# Patient Record
Sex: FEMALE | Race: WHITE | Employment: OTHER | URBAN - METROPOLITAN AREA
[De-identification: names, ages, dates, MRNs, and addresses within clinical notes are randomized per-mention and may not be internally consistent; named-entity substitution may affect disease eponyms.]

---

## 2017-02-01 ENCOUNTER — HOSPITAL ENCOUNTER (OUTPATIENT)
Dept: OTHER | Age: 82
Discharge: HOME | End: 2017-02-01
Attending: INTERNAL MEDICINE | Admitting: INTERNAL MEDICINE

## 2017-02-15 LAB
ALBUMIN SERPL-MCNC: 4.4 GM/DL (ref 3.4–5)
ALP BLD-CCNC: 74 IU/L (ref 40–129)
ALT SERPL-CCNC: 16 U/L (ref 10–40)
ANION GAP SERPL CALCULATED.3IONS-SCNC: 13 MMOL/L (ref 4–16)
AST SERPL-CCNC: 25 IU/L (ref 15–37)
BILIRUB SERPL-MCNC: 1 MG/DL (ref 0–1)
BUN BLDV-MCNC: 16 MG/DL (ref 6–23)
CALCIUM SERPL-MCNC: 10.1 MG/DL (ref 8.3–10.6)
CHLORIDE BLD-SCNC: 98 MMOL/L (ref 99–110)
CHOLESTEROL: 192 MG/DL
CO2: 28 MMOL/L (ref 21–32)
CREAT SERPL-MCNC: 0.8 MG/DL (ref 0.6–1.1)
GFR AFRICAN AMERICAN: >60 ML/MIN/1.73M2
GFR NON-AFRICAN AMERICAN: >60 ML/MIN/1.73M2
GLUCOSE FASTING: 99 MG/DL (ref 70–99)
HCT VFR BLD CALC: 43.9 % (ref 37–47)
HDLC SERPL-MCNC: 110 MG/DL
HEMOGLOBIN: 14.4 GM/DL (ref 12.5–16)
LDL CHOLESTEROL DIRECT: 92 MG/DL
MCH RBC QN AUTO: 32.4 PG (ref 27–31)
MCHC RBC AUTO-ENTMCNC: 32.8 % (ref 32–36)
MCV RBC AUTO: 98.7 FL (ref 78–100)
PDW BLD-RTO: 13.3 % (ref 11.7–14.9)
PLATELET # BLD: 202 K/CU MM (ref 140–440)
PMV BLD AUTO: 9.8 FL (ref 7.5–11.1)
POTASSIUM SERPL-SCNC: 3.9 MMOL/L (ref 3.5–5.1)
RBC # BLD: 4.45 M/CU MM (ref 4.2–5.4)
SODIUM BLD-SCNC: 139 MMOL/L (ref 135–145)
T4 FREE: 1.31 NG/DL (ref 0.9–1.8)
TOTAL CK: 101 IU/L (ref 26–140)
TOTAL PROTEIN: 7 GM/DL (ref 6.4–8.2)
TRIGL SERPL-MCNC: 58 MG/DL
TSH HIGH SENSITIVITY: 1.3 UIU/ML (ref 0.27–4.2)
VITAMIN B-12: 647.7 PG/ML (ref 211–911)
WBC # BLD: 5.5 K/CU MM (ref 4–10.5)

## 2017-02-18 LAB
VITAMIN D 1,25-DIHYDROXY: 31.6
VITAMIN D 25-HYDROXY: 28.51 NG/ML

## 2017-07-24 ENCOUNTER — HOSPITAL ENCOUNTER (OUTPATIENT)
Dept: SPEECH THERAPY | Age: 82
Discharge: HOME | End: 2017-07-31
Attending: FAMILY MEDICINE | Admitting: FAMILY MEDICINE

## 2017-07-24 DIAGNOSIS — I63.419 CEREBRAL INFARCTION DUE TO EMBOLISM OF MIDDLE CEREBRAL ARTERY, UNSPECIFIED BLOOD VESSEL LATERALITY (HCC): Primary | ICD-10-CM

## 2017-07-27 ENCOUNTER — HOSPITAL ENCOUNTER (OUTPATIENT)
Dept: SPEECH THERAPY | Age: 82
Discharge: HOME | End: 2017-07-27
Admitting: FAMILY MEDICINE

## 2017-07-31 ENCOUNTER — HOSPITAL ENCOUNTER (OUTPATIENT)
Dept: SPEECH THERAPY | Age: 82
Discharge: HOME | End: 2017-07-31
Admitting: FAMILY MEDICINE

## 2017-08-01 ENCOUNTER — HOSPITAL ENCOUNTER (OUTPATIENT)
Dept: OTHER | Age: 82
Discharge: HOME | End: 2017-08-31
Attending: FAMILY MEDICINE | Admitting: FAMILY MEDICINE

## 2017-08-03 ENCOUNTER — HOSPITAL ENCOUNTER (OUTPATIENT)
Dept: SPEECH THERAPY | Age: 82
Discharge: HOME | End: 2017-08-03
Admitting: FAMILY MEDICINE

## 2017-08-10 ENCOUNTER — HOSPITAL ENCOUNTER (OUTPATIENT)
Dept: SPEECH THERAPY | Age: 82
Discharge: HOME | End: 2017-08-10
Admitting: FAMILY MEDICINE

## 2017-08-14 ENCOUNTER — HOSPITAL ENCOUNTER (OUTPATIENT)
Dept: SPEECH THERAPY | Age: 82
Discharge: HOME | End: 2017-08-14
Admitting: FAMILY MEDICINE

## 2017-08-17 ENCOUNTER — HOSPITAL ENCOUNTER (OUTPATIENT)
Dept: SPEECH THERAPY | Age: 82
Discharge: HOME | End: 2017-08-17
Admitting: FAMILY MEDICINE

## 2017-08-21 ENCOUNTER — HOSPITAL ENCOUNTER (OUTPATIENT)
Dept: SPEECH THERAPY | Age: 82
Discharge: HOME | End: 2017-08-21
Admitting: FAMILY MEDICINE

## 2017-08-24 ENCOUNTER — HOSPITAL ENCOUNTER (OUTPATIENT)
Dept: SPEECH THERAPY | Age: 82
Discharge: HOME | End: 2017-08-24
Admitting: FAMILY MEDICINE

## 2017-08-28 ENCOUNTER — HOSPITAL ENCOUNTER (OUTPATIENT)
Dept: SPEECH THERAPY | Age: 82
Discharge: HOME | End: 2017-08-28
Admitting: FAMILY MEDICINE

## 2017-09-01 ENCOUNTER — HOSPITAL ENCOUNTER (OUTPATIENT)
Dept: OTHER | Age: 82
Discharge: HOME | End: 2017-09-30
Attending: FAMILY MEDICINE | Admitting: FAMILY MEDICINE

## 2017-09-07 ENCOUNTER — HOSPITAL ENCOUNTER (OUTPATIENT)
Dept: SPEECH THERAPY | Age: 82
Discharge: HOME | End: 2017-09-07
Admitting: FAMILY MEDICINE

## 2017-09-11 ENCOUNTER — HOSPITAL ENCOUNTER (OUTPATIENT)
Dept: SPEECH THERAPY | Age: 82
Discharge: HOME | End: 2017-09-11
Admitting: FAMILY MEDICINE

## 2017-09-14 ENCOUNTER — HOSPITAL ENCOUNTER (OUTPATIENT)
Dept: SPEECH THERAPY | Age: 82
Discharge: HOME | End: 2017-09-14
Admitting: FAMILY MEDICINE

## 2017-09-18 ENCOUNTER — HOSPITAL ENCOUNTER (OUTPATIENT)
Dept: SPEECH THERAPY | Age: 82
Discharge: HOME | End: 2017-09-18
Admitting: FAMILY MEDICINE

## 2017-09-21 ENCOUNTER — HOSPITAL ENCOUNTER (OUTPATIENT)
Dept: SPEECH THERAPY | Age: 82
Discharge: HOME | End: 2017-09-21
Admitting: FAMILY MEDICINE

## 2017-09-25 ENCOUNTER — HOSPITAL ENCOUNTER (OUTPATIENT)
Dept: SPEECH THERAPY | Age: 82
Discharge: HOME | End: 2017-09-25
Admitting: FAMILY MEDICINE

## 2017-09-28 ENCOUNTER — HOSPITAL ENCOUNTER (OUTPATIENT)
Dept: SPEECH THERAPY | Age: 82
Discharge: HOME | End: 2017-09-28
Admitting: FAMILY MEDICINE

## 2017-10-01 ENCOUNTER — HOSPITAL ENCOUNTER (OUTPATIENT)
Dept: OTHER | Age: 82
Discharge: HOME | End: 2017-10-17
Attending: FAMILY MEDICINE | Admitting: FAMILY MEDICINE

## 2017-10-02 ENCOUNTER — HOSPITAL ENCOUNTER (OUTPATIENT)
Dept: SPEECH THERAPY | Age: 82
Discharge: HOME | End: 2017-10-02
Admitting: FAMILY MEDICINE

## 2017-10-02 NOTE — FLOWSHEET NOTE
questions   Reading comprehension of five sentence passages was 70% accurate based on responses to yes/no questions. Not addressed today. Martina Davenport reported she has issues with picking up where she left off in books she is reading at home. Not addressed today Not addressed today Not addressed today Not addressed today Not addressed today     Goal 3: Will demonstrate improved word fluency as measured by generating 15 or more items within a category accross three categories within one minute each   Martina Davenport completed word retrieval tasks with 100% accuracy and no further instruction needed to complete additional tasks. Martina Davenport completed word retrieval tasks during her home program with 100% accuracy. Martina Davenport completed word retrieval tasks during her home program.  Martina Davenport needed minimal verbal cues to discuss adding items to abstract categories. Martina Davenport reported she thoroughly enjoyed completing her word retrieval tasks at home. Martina Davenport completed the task to add concrete and abstract items to a  category with no cues. Martina Davenport did not need cues to add to concrete categories. She needed minimal cues to add to abstract categories. With descriptive verbal cues from the clinician, she was able to come up with the right word within the category. Will plan to assess next session. Martina Davenport and the clinician discussed adding items to concrete categories. The categories today involved movies, dancers, singers, and actors. She needed moderate verbal cues to do this. She benefited from semantic and initial letter name cues. When she could not think of the right name within the category, she was given the names of people. This happened twice. She expressed her enjoyment in learning about modern day items fitting in these categories. Discussed adding items to concrete categories. She needed minimal verbal cues to do so. Her word retrieval was prompt when she was given semantic and initial letter name cues.       Goal 4: discontinued Goal discontinued Goal discontinued   Goal 7- Will demonstrate functional executive function for daily tasks (goal added 8-) Mario Alberto Levi completed an activity on analyzing a bill,  writing a check to pay a bill, and balancing a checkbook. She demonstrated 100% accuracy in response to problem solving questions regarding checks and bills. Mario Alberto Levi wrote a check and balanced a checkbook with no cues. Mario Alberto Levi self-monitored throughout and expressed her need for someone to check her work when finished. Plan to communicate with her daughter and daughter-in-law to discuss her ability to complete bill paying with distant supervision. In today's session, a note was sent home with Mario Alberto Levi to review with her daughter and daughter-in-law to advocate to complete her bill payments with some distance supervision. Mario Alberto Levi also reviewed the [employer] and calendar and demonstrated her understanding of certain event dates, times, and details. A questionnaire with items regarding activities of daily living, problem-solving, and social-emotional issues was reviewed and sent home for Mario Alberto Levi to complete. Mario Alberto Levi verbalized knowing her limits for tasks at home. The information obtained from this questionnaire will guide future therapy activities to cater to her needs at home. Mario Alberto Levi reported that she will be responsible for paying some of her bills with supervision. Mario Albreto Levi reported that she uses strategies for completing daily activities at home and indicated that she wished to discontinue this goal and focus on improving her word retrieval skills. Mario Alberto Levi indicated that she will complete the daily living questionnaire and discuss her functional daily tasks in the next session. She reported going to the store over the weekend and that she was unable to function in this environment like she used to.  Mario Alberto Levi completed the daily living questionnaire and reported to not want to focus on any functional activities at home. Although she writes her physical and mental abilities have changed, she would like to continue focusing on her issues with word retrieval. Goal discontinued Goal discontinued    FEffective Strategies that Improve fx: Modeling of target responses, written cues Modeling of target responses Modeling of target responses Modeling of target responses Modeling of target responses Modeling of target responses Modeling of target responses   Barriers to progress: Medical diagnosis Medical diagnosis Medical diagnosis Medical diagnosis Medical diagnosis Medical diagnosis Medical diagnosis   Education completed:     Reviewed treatment goals, progress in session and home program with Ny Seay who verbalized understanding Reviewed treatment goals, progress in session and home program with Ny Seay who verbalized understanding Reviewed treatment goals, progress in session and home program with Ny Seay who verbalized understanding Reviewed treatment goals, progress in session and home program with Ny Seay who verbalized understanding Reviewed treatment goals, progress in session and home program with Ny Seay who verbalized understanding Reviewed treatment goals, progress in session and home program with Ny Seay who verbalized understanding Reviewed treatment goals, progress in session and home program with Ny Seay who verbalized understanding   Home Exercise Plan:     Word retrieval worksheets Outpatient Daily Living Questionnaire  Word retrieval worksheets (concrete and abstract categories) Outpatient Daily Living Questionnaire; Word retrieval worksheets (concrete and abstract categories); Listing 5 items in a category Word retrieval worksheets (concrete and abstract categories);  Listing 5 items in a category Word retrieval worksheet (adding to concrete categories) Word retrieval worksheet (matching a quality descriptor to an item and listing items based on locations)     Objective Findings: see

## 2017-10-05 ENCOUNTER — HOSPITAL ENCOUNTER (OUTPATIENT)
Dept: SPEECH THERAPY | Age: 82
Discharge: HOME | End: 2017-10-05
Admitting: FAMILY MEDICINE

## 2017-10-05 NOTE — FLOWSHEET NOTE
[x][facility] [address]fr[address]      MAR[address] [facility] [address] Dept          Outpatient Rehab Dept     [address]       [facility] [address], [address], [address]       [address]     [phone] [phone] [phone]  [][facility] Rishirânio [address]           Outpatient Speech Dept. 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 Lorena Hammer of care signed (Y/N):  Not yet       Treatment Provided: cognitive treatment/speech treatment     Date 2017 0- 10-2-2017 10-5-2017   Time in/Time out 10:24-11:25AM 10:26-11:15AM 10:15-11:13AM 10:37-11:20AM 9:12-10:00AM 10:36-11:04AM 10:31-11:01AM 10:24-10:54AM   Total Timed Code Min 61 mins 49 mins 58 mins 0 0 0 0 0   Total Treatment Minutes 61 mins 49 mins 58 mins 43 mins 48 mins 28 mins 30 mins 30 mins   Units  G Code applied: 4 cognitive 3 cognitive 4 cognitive 1 speech 1 speech 1 speech 1 speech 1 speech   Subjective     Alert, cooperative. Claris Merlin was highly motivated and reported that she will continue to advocate for her financial independence with some support.   She also reported think of the right name within the category, she was given the names of people. This happened twice. She expressed her enjoyment in learning about modern day items fitting in these categories. Discussed adding items to concrete categories. She needed minimal verbal cues to do so. Her word retrieval was prompt when she was given semantic and initial letter name cues. Discussed her home programming word retrieval activities that she completed with 100% accuracy. She indicated these were easy for her. Goal 4: Will demonstrate accurate word choice within connected speech with 90% or better consistency within a 15 or longer sample   In 5 minutes of a language sample, Ninoska Epstein displyed 86% accuracy in her word choice. In a 10 minute language sample in the session, Ninoska Epstein demonstrated 80% accurate word choice. In a 15 minute conversation about her progress, Ninoska Epstein displayed 90% accurate word choice. In a 15 minute conversation about her goals and home life, Ninoska Epstein demonstrated 75% accurate word choice. In a 10 minute unstructured conversation, Ninoska Epstein demonstrated 70% accurate word choice. She made more word choice errors than usual is today's session. Ninoska Epstein demonstrated accurate word choice 80% of the time in this session. Ninoska Epstein demonstrated accurate word choice 90% this session. Ninoska Epstein demonstrated 2 specific word finding issues in this session. Her accurate word choice was 95% this session.     FEffective Strategies that Improve fx: Modeling of target responses, written cues Modeling of target responses Modeling of target responses Modeling of target responses Modeling of target responses Modeling of target responses Modeling of target responses Modeling of target responses   Barriers to progress: Medical diagnosis Medical diagnosis Medical diagnosis Medical diagnosis Medical diagnosis Medical diagnosis Medical diagnosis Medical diagnosis   Education completed:     Reviewed treatment goals, progress in session and home program with Nicholas Ny who verbalized understanding Reviewed treatment goals, progress in session and home program with Nicholas Ny who verbalized understanding Reviewed treatment goals, progress in session and home program with Nicholas Ny who verbalized understanding Reviewed treatment goals, progress in session and home program with Nicholas Ny who verbalized understanding Reviewed treatment goals, progress in session and home program with Nicholas Ny who verbalized understanding Reviewed treatment goals, progress in session and home program with Nicholas Ny who verbalized understanding Reviewed treatment goals, progress in session and home program with Nicholas Ny who verbalized understanding Reviewed treatment goals, progress in session and home program with Nicholas Ny who verbalized understanding   Home Exercise Plan:     Word retrieval worksheets Outpatient Daily Living Questionnaire  Word retrieval worksheets (concrete and abstract categories) Outpatient Daily Living Questionnaire; Word retrieval worksheets (concrete and abstract categories); Listing 5 items in a category Word retrieval worksheets (concrete and abstract categories);  Listing 5 items in a category Word retrieval worksheet (adding to concrete categories) Word retrieval worksheet (matching a quality descriptor to an item and listing items based on locations) Word retrieval worksheet (adding to concrete categories of people)     Objective Findings: see above    Interventions used this date:  [x] Speech Treatment       [x] Instruction in HEP  [] Group   [] Dysphagia Treatment   [] Cognitive Skill Yury  [] Motor  [] Voice Treatment       []  AAC  Other:    Communication with other providers: none    Adverse Reactions to treatment: none     Treatment/Activity Tolerance:     [x]  Patient tolerated treatment well []  Patient limited by fatique    []  Patient limited by pain []  Patient limited by other medical complications   []  Other:     Patient Requires

## 2017-10-12 ENCOUNTER — HOSPITAL ENCOUNTER (OUTPATIENT)
Dept: SPEECH THERAPY | Age: 82
Discharge: HOME | End: 2017-10-12
Admitting: FAMILY MEDICINE

## 2017-10-12 NOTE — FLOWSHEET NOTE
[x][facility] [address]      MARLON [facility] [address] Dept          Outpatient Rehab Dept     [address]       [facility] [address], [address], [address]       Rosanna Verdin, [address]     [phone] [phone] [phone]  [][facility] [address]elyio [address]           Outpatient Speech Dept. 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 Travis Ross of care signed (Y/N):  Not yet       Treatment Provided: cognitive treatment/speech treatment     Date 2017 6- 10-2-2017 10-5-2017 10-   Time in/Time out 10:24-11:25AM 10:26-11:15AM 10:15-11:13AM 10:37-11:20AM 9:12-10:00AM 10:36-11:04AM 10:31-11:01AM 10:24-10:54AM 10:35-11:05AM   Total Timed Code Min 61 mins 49 mins 58 mins 0 0 0 0 0 0   Total Treatment Minutes 61 mins 49 mins 58 mins 43 mins 48 mins 28 mins 30 mins 30 mins 30 mins   Units  G Code applied: 4 cognitive 3 cognitive 4 cognitive 1 speech 1 speech 1 speech 1 speech 1 speech 1 speech   Subjective     Alert, cooperative.   Nicholas Anant was highly motivated and reported that she will continue to advocate for her financial addressed today Not addressed today Not addressed today Not addressed today Based on responses to open ended, yes/no, and m/c questions, Josh Fajardo demonstrated 83% accuracy for auditory comprehension of 6 sentence passages. Goal met Not addressed today. Goal 2: Will demonstrate reading comprehension of 6 sentence passages with 80% or better accuracy based upon responses to yes/no questions   Reading comprehension of five sentence passages was 70% accurate based on responses to yes/no questions. Not addressed today. Josh Fajardo reported she has issues with picking up where she left off in books she is reading at home. Not addressed today Not addressed today Not addressed today Not addressed today Not addressed today Based on responses to m/c questions, Josh Fajardo demonstrated 90% accuracy for reading comprehension of passages with multiple paragraphs. Goal met Not addressed today. Goal 3: Will demonstrate improved word fluency as measured by generating 15 or more items within a category accross three categories within one minute each   Josh Fajardo completed word retrieval tasks with 100% accuracy and no further instruction needed to complete additional tasks. Josh Fajardo completed word retrieval tasks during her home program with 100% accuracy. Jsoh Fajardo completed word retrieval tasks during her home program.  Josh Fajardo needed minimal verbal cues to discuss adding items to abstract categories. Josh Fajardo reported she thoroughly enjoyed completing her word retrieval tasks at home. Josh Fajardo completed the task to add concrete and abstract items to a  category with no cues. Josh Fajardo did not need cues to add to concrete categories. She needed minimal cues to add to abstract categories. With descriptive verbal cues from the clinician, she was able to come up with the right word within the category. Will plan to assess next session. Josh Fajardo and the clinician discussed adding items to concrete categories.   The categories today involved movies, dancers, singers, and actors. She needed moderate verbal cues to do this. She benefited from semantic and initial letter name cues. When she could not think of the right name within the category, she was given the names of people. This happened twice. She expressed her enjoyment in learning about modern day items fitting in these categories. Discussed adding items to concrete categories. She needed minimal verbal cues to do so. Her word retrieval was prompt when she was given semantic and initial letter name cues. Discussed her home programming word retrieval activities that she completed with 100% accuracy. She indicated these were easy for her. Discussed adding names to concrete categories (e.g., athletes, Sikh leaders) and needed minimal cues to do so. Discussed booklet of home programming word retrieval activities for her to complete. Goal 4: Will demonstrate accurate word choice within connected speech with 90% or better consistency within a 15 or longer sample   In 5 minutes of a language sample, Radha Pabon displyed 86% accuracy in her word choice. In a 10 minute language sample in the session, Radha Pabon demonstrated 80% accurate word choice. In a 15 minute conversation about her progress, Radha Pabon displayed 90% accurate word choice. In a 15 minute conversation about her goals and home life, Radha Pabon demonstrated 75% accurate word choice. In a 10 minute unstructured conversation, Radha Pabon demonstrated 70% accurate word choice. She made more word choice errors than usual is today's session. Radha Pabon demonstrated accurate word choice 80% of the time in this session. Radha Pabon demonstrated accurate word choice 90% this session. Radha Pabon demonstrated 2 specific word finding issues in this session. Her accurate word choice was 95% this session. Not addressed this date.     FEffective Strategies that Improve fx: Modeling of target responses, written cues Modeling of target responses categories of people) Word retrieval worksheet (adding to concrete categories of people)     Objective Findings: see above      G-Code Selection: (On Eval and every 10th visit or Discharge)    MEASURE    [x]  Spoken Language/Comprehension     [] Current ()  [x] Goal ()  [x] DC ()      SEVERITY    CURRENT GOAL  DISCHARGE   [] CH (0% Impaired, NOMS 7)  [] CI (1-19% Impaired, NOMS 6)  [] CJ (20-39% Impaired, NOMS 5)  [] CK  (40-59% Impairment, NOMS 4)  [] CL  (60-79% Impairment, NOMS 3 )  [] CM  (80-99% Impairment, NOMS 2)   [] CN  (100% Impairment, NOMS 1) [x] CH (0% Impaired, NOMS 7)  [] CI (1-19% Impaired, NOMS 6)  [] CJ (20-39% Impaired, NOMS 5)  [] CK  (40-59% Impairment, NOMS 4)  [] CL  (60-79% Impairment, NOMS 3 )  [] CM  (80-99% Impairment, NOMS 2)   [] CN  (100% Impairment, NOMS 1) [x] CH (0% Impaired, NOMS 7)  [] CI (1-19% Impaired, NOMS 6)  [] CJ (20-39% Impaired, NOMS 5)  [] CK  (40-59% Impairment, NOMS 4)  [] CL  (60-79% Impairment, NOMS 3 )  [] CM  (80-99% Impairment, NOMS 2)   [] CN  (100% Impairment, NOMS 1)         [x] Spoken Language/Expression     [] Current ()  [x] Goal ()  [x] DC ()    SEVERITY    CURRENT GOAL  DISCHARGE   [] CH (0% Impaired, NOMS 7)  [] CI (1-19% Impaired, NOMS 6)  [] CJ (20-39% Impaired, NOMS 5)  [] CK  (40-59% Impairment, NOMS 4)  [] CL  (60-79% Impairment, NOMS 3 )  [] CM  (80-99% Impairment, NOMS 2)   [] CN  (100% Impairment, NOMS 1) [] CH (0% Impaired, NOMS 7)  [x] CI (1-19% Impaired, NOMS 6)  [] CJ (20-39% Impaired, NOMS 5)  [] CK  (40-59% Impairment, NOMS 4)  [] CL  (60-79% Impairment, NOMS 3 )  [] CM  (80-99% Impairment, NOMS 2)   [] CN  (100% Impairment, NOMS 1) [x] CH (0% Impaired, NOMS 7)  [] CI (1-19% Impaired, NOMS 6)  [] CJ (20-39% Impaired, NOMS 5)  [] CK  (40-59% Impairment, NOMS 4)  [] CL  (60-79% Impairment, NOMS 3 )  [] CM  (80-99% Impairment, NOMS 2)   [] CN  (100% Impairment, NOMS 1)          [x] Other Speech/Language    [] Current () [x] Goal () [x] DC ()    SEVERITY    CURRENT GOAL  DISCHARGE   [] CH (0% Impaired, NOMS 7)  [] CI (1-19% Impaired, NOMS 6)  [] CJ (20-39% Impaired, NOMS 5)  [] CK  (40-59% Impairment, NOMS 4)  [] CL  (60-79% Impairment, NOMS 3 )  [] CM  (80-99% Impairment, NOMS 2)   [] CN  (100% Impairment, NOMS 1) [] CH (0% Impaired, NOMS 7)  [x] CI (1-19% Impaired, NOMS 6)  [] CJ (20-39% Impaired, NOMS 5)  [] CK  (40-59% Impairment, NOMS 4)  [] CL  (60-79% Impairment, NOMS 3 )  [] CM  (80-99% Impairment, NOMS 2)   [] CN  (100% Impairment, NOMS 1) [x] CH (0% Impaired, NOMS 7)  [] CI (1-19% Impaired, NOMS 6)  [] CJ (20-39% Impaired, NOMS 5)  [] CK  (40-59% Impairment, NOMS 4)  [] CL  (60-79% Impairment, NOMS 3 )  [] CM  (80-99% Impairment, NOMS 2)   [] CN  (100% Impairment, NOMS 1)       Interventions used this date:  [x] Speech Treatment       [x] Instruction in HEP  [] Group   [] Dysphagia Treatment   [] Cognitive Skill Yury  [] Motor  [] Voice Treatment       []  AAC  Other:    Communication with other providers: none    Adverse Reactions to treatment: none     Treatment/Activity Tolerance:     [x]  Patient tolerated treatment well []  Patient limited by fatique    []  Patient limited by pain []  Patient limited by other medical complications   []  Other:     Patient Requires Follow-up:  []  Yes  [x]  No    Plan: []  Continue per plan of care []  Alter current plan (see comments)   []  Plan of care initiated []  Hold pending MD visit [x]  Discharge    Electronically signed by:   Anthony Viera  Graduate Clinician    Azeb Lopez.  MS Stewart, CCC-SLP  Speech/Language Pathologist  10/12/2017  1:54 PM

## 2017-10-12 NOTE — PROGRESS NOTES
Other:     Significant Findings At Last Visit/Comments:  Ninoska Epstein reported her improvement of functional skills at home and indicated she wanted to focus on word retrieval activities. She demonstrated improvements in completing word retrieval activities independently and advocated for discharge. Progress toward goals:    Goal 1: Will demonstrate auditory comprehension of 6 sentence passages with 80% or bettter accuracy based upon responses to yes/no questions  Progress: Auditory comprehension was last assessed on 10/5/2017. Based on responses to open ended, yes/no, and m/c questions, Ninoska Epstein demonstrated 83% accuracy for auditory comprehension of 6 sentence passages. Ninoska Epstein consistently demonstrated comprehension of passages and met this goal.    Goal 2: Will demonstrate reading comprehension of 6 sentence passages with 80% or better accuracy based upon responses to yes/no questions  Progress: Auditory comprehension was last assessed on 10/5/2017. Based on responses to m/c questions, Ninoska Epstein demonstrated 90% accuracy for reading comprehension of passages with multiple paragraphs. Ninoska Epstein consistently demonstrated comprehension of passages and met this goal.    Goal 3: Will demonstrate improved word fluency as measured by generating 15 or more items within a category accross three categories within one minute each  Progress: In the last few weeks, Ninoska Epstein practiced adding items to both concrete and abstract categories and utilized her word finding strategies and associations to do so accurately (e.g., opposites, initial letters, descriptions of items). She completed all home programming activities with accuracy and needed occasional minimal verbal cues for more complex categories/topics. Ninoska Epstein demonstrated appropriate word choice within connected speech and a decrease in delay in responses in the last few sessions. Goal 4:  Will demonstrate accurate word choice within connected speech with 90% or better consistency within a 15 or longer sample  Progress:  Yanna Angeles demonstrated measurable word choice in the last few weeks. Her accurate word choice remained >80% in the 6/8 of the last sessions. In the last session, this goal was informally assessed and Yanna Angeles demonstrated no word finding errors. Frequency/Duration:  # Days per week: [] 1 day # Weeks: [] 1 week [] 4 weeks      [x] 2 days? [] 2 weeks [] 5 weeks     [] 3 days   [] 3 weeks [] 6 weeks     Rehab Potential: [] Excellent [x] Good [] Fair  [] Poor     Goal Status:  [x] Achieved [] Partially Achieved  [] Not Achieved     Patient Status: [] Continue per initial plan of Care     [x] Patient now discharged     [] Additional visits requested, Please re-certify for additional visits:    Electronically signed by:   Tawnya Ham Stage  Graduate Clinician    Suzanna Whitaker.  MS Stewart, CCC-SLP  Speech/Language Pathologist  10/12/2017  2:17 PM

## 2018-11-01 ENCOUNTER — HOSPITAL ENCOUNTER (OUTPATIENT)
Age: 83
Setting detail: SPECIMEN
Discharge: HOME | End: 2018-11-01
Payer: MEDICARE

## 2018-11-01 LAB
FOLATE: 11.2 NG/ML (ref 3.1–17.5)
VITAMIN B-12: 459.4 PG/ML (ref 211–911)

## 2018-11-01 PROCEDURE — 82746 ASSAY OF FOLIC ACID SERUM: CPT

## 2018-11-01 PROCEDURE — 36415 COLL VENOUS BLD VENIPUNCTURE: CPT

## 2018-11-01 PROCEDURE — 82607 VITAMIN B-12: CPT

## 2019-01-03 ENCOUNTER — APPOINTMENT (OUTPATIENT)
Dept: GENERAL RADIOLOGY | Age: 84
End: 2019-01-03
Payer: MEDICARE

## 2019-01-03 ENCOUNTER — HOSPITAL ENCOUNTER (EMERGENCY)
Age: 84
Discharge: HOME | End: 2019-01-03
Attending: EMERGENCY MEDICINE
Payer: MEDICARE

## 2019-01-03 VITALS
TEMPERATURE: 97.7 F | SYSTOLIC BLOOD PRESSURE: 178 MMHG | WEIGHT: 115.4 LBS | HEART RATE: 60 BPM | BODY MASS INDEX: 18.11 KG/M2 | RESPIRATION RATE: 14 BRPM | DIASTOLIC BLOOD PRESSURE: 53 MMHG | OXYGEN SATURATION: 98 % | HEIGHT: 67 IN

## 2019-01-03 DIAGNOSIS — S32.591A OTHER CLOSED FRACTURE OF RIGHT PUBIS, INITIAL ENCOUNTER (HCC): Primary | ICD-10-CM

## 2019-01-03 PROCEDURE — 6370000000 HC RX 637 (ALT 250 FOR IP): Performed by: EMERGENCY MEDICINE

## 2019-01-03 PROCEDURE — 73501 X-RAY EXAM HIP UNI 1 VIEW: CPT

## 2019-01-03 PROCEDURE — 99283 EMERGENCY DEPT VISIT LOW MDM: CPT

## 2019-01-03 RX ORDER — IBUPROFEN 400 MG/1
400 TABLET ORAL ONCE
Status: COMPLETED | OUTPATIENT
Start: 2019-01-03 | End: 2019-01-03

## 2019-01-03 RX ADMIN — IBUPROFEN 400 MG: 400 TABLET ORAL at 17:25

## 2019-01-03 ASSESSMENT — PAIN DESCRIPTION - ORIENTATION: ORIENTATION: RIGHT

## 2019-01-03 ASSESSMENT — PAIN DESCRIPTION - PAIN TYPE: TYPE: ACUTE PAIN

## 2019-01-03 ASSESSMENT — PAIN DESCRIPTION - DESCRIPTORS: DESCRIPTORS: ACHING;DISCOMFORT

## 2019-01-03 ASSESSMENT — PAIN DESCRIPTION - LOCATION: LOCATION: HIP

## 2019-01-03 ASSESSMENT — PAIN DESCRIPTION - ONSET: ONSET: SUDDEN

## 2019-01-03 ASSESSMENT — PAIN SCALES - GENERAL: PAINLEVEL_OUTOF10: 5

## 2019-01-03 ASSESSMENT — PAIN DESCRIPTION - FREQUENCY: FREQUENCY: CONTINUOUS

## 2019-04-18 ENCOUNTER — APPOINTMENT (OUTPATIENT)
Dept: CT IMAGING | Age: 84
DRG: 689 | End: 2019-04-18
Payer: MEDICARE

## 2019-04-18 ENCOUNTER — APPOINTMENT (OUTPATIENT)
Dept: GENERAL RADIOLOGY | Age: 84
DRG: 689 | End: 2019-04-18
Payer: MEDICARE

## 2019-04-18 ENCOUNTER — HOSPITAL ENCOUNTER (INPATIENT)
Age: 84
LOS: 3 days | Discharge: HOME | DRG: 689 | End: 2019-04-22
Attending: EMERGENCY MEDICINE | Admitting: HOSPITALIST
Payer: MEDICARE

## 2019-04-18 DIAGNOSIS — R41.0 CONFUSION: Primary | ICD-10-CM

## 2019-04-18 DIAGNOSIS — N30.01 ACUTE CYSTITIS WITH HEMATURIA: ICD-10-CM

## 2019-04-18 PROBLEM — G93.40 ENCEPHALOPATHY: Status: ACTIVE | Noted: 2019-04-18

## 2019-04-18 LAB
ALBUMIN SERPL-MCNC: 3.9 GM/DL (ref 3.4–5)
ALP BLD-CCNC: 74 IU/L (ref 40–129)
ALT SERPL-CCNC: 13 U/L (ref 10–40)
ANION GAP SERPL CALCULATED.3IONS-SCNC: 12 MMOL/L (ref 4–16)
APTT: 32.8 SECONDS (ref 21.2–33)
AST SERPL-CCNC: 22 IU/L (ref 15–37)
BACTERIA: ABNORMAL /HPF
BASOPHILS ABSOLUTE: 0 K/CU MM
BASOPHILS RELATIVE PERCENT: 0.2 % (ref 0–1)
BILIRUB SERPL-MCNC: 0.4 MG/DL (ref 0–1)
BILIRUBIN URINE: NEGATIVE MG/DL
BLOOD, URINE: ABNORMAL
BUN BLDV-MCNC: 26 MG/DL (ref 6–23)
CALCIUM SERPL-MCNC: 9.2 MG/DL (ref 8.3–10.6)
CHLORIDE BLD-SCNC: 98 MMOL/L (ref 99–110)
CHP ED QC CHECK: NORMAL
CLARITY: ABNORMAL
CO2: 25 MMOL/L (ref 21–32)
COLOR: YELLOW
CREAT SERPL-MCNC: 0.9 MG/DL (ref 0.6–1.1)
DIFFERENTIAL TYPE: ABNORMAL
EOSINOPHILS ABSOLUTE: 0 K/CU MM
EOSINOPHILS RELATIVE PERCENT: 0 % (ref 0–3)
GFR AFRICAN AMERICAN: >60 ML/MIN/1.73M2
GFR NON-AFRICAN AMERICAN: 60 ML/MIN/1.73M2
GLUCOSE BLD-MCNC: 126 MG/DL
GLUCOSE BLD-MCNC: 126 MG/DL (ref 70–99)
GLUCOSE BLD-MCNC: 142 MG/DL (ref 70–99)
GLUCOSE, URINE: NEGATIVE MG/DL
HCT VFR BLD CALC: 38.6 % (ref 37–47)
HEMOGLOBIN: 12.8 GM/DL (ref 12.5–16)
IMMATURE NEUTROPHIL %: 0.5 % (ref 0–0.43)
INR BLD: 1.24 INDEX
KETONES, URINE: NEGATIVE MG/DL
LACTATE: 1.3 MMOL/L (ref 0.4–2)
LEUKOCYTE ESTERASE, URINE: ABNORMAL
LYMPHOCYTES ABSOLUTE: 0.7 K/CU MM
LYMPHOCYTES RELATIVE PERCENT: 5.4 % (ref 24–44)
MCH RBC QN AUTO: 32.2 PG (ref 27–31)
MCHC RBC AUTO-ENTMCNC: 33.2 % (ref 32–36)
MCV RBC AUTO: 97 FL (ref 78–100)
MONOCYTES ABSOLUTE: 1.7 K/CU MM
MONOCYTES RELATIVE PERCENT: 13.3 % (ref 0–4)
MUCUS: ABNORMAL HPF
NITRITE URINE, QUANTITATIVE: POSITIVE
NUCLEATED RBC %: 0 %
PDW BLD-RTO: 12.9 % (ref 11.7–14.9)
PH, URINE: 5 (ref 5–8)
PLATELET # BLD: 171 K/CU MM (ref 140–440)
PMV BLD AUTO: 9.7 FL (ref 7.5–11.1)
POTASSIUM SERPL-SCNC: 4.1 MMOL/L (ref 3.5–5.1)
PROTEIN UA: 100 MG/DL
PROTHROMBIN TIME: 14.1 SECONDS (ref 9.12–12.5)
RBC # BLD: 3.98 M/CU MM (ref 4.2–5.4)
RBC URINE: 10 /HPF (ref 0–6)
SEGMENTED NEUTROPHILS ABSOLUTE COUNT: 10.3 K/CU MM
SEGMENTED NEUTROPHILS RELATIVE PERCENT: 80.6 % (ref 36–66)
SODIUM BLD-SCNC: 135 MMOL/L (ref 135–145)
SPECIFIC GRAVITY UA: 1.01 (ref 1–1.03)
SQUAMOUS EPITHELIAL: <1 /HPF
TOTAL IMMATURE NEUTOROPHIL: 0.07 K/CU MM
TOTAL NUCLEATED RBC: 0 K/CU MM
TOTAL PROTEIN: 6.9 GM/DL (ref 6.4–8.2)
TRICHOMONAS: ABNORMAL /HPF
TROPONIN T: <0.01 NG/ML
UROBILINOGEN, URINE: NORMAL MG/DL (ref 0.2–1)
WBC # BLD: 12.7 K/CU MM (ref 4–10.5)
WBC CLUMP: ABNORMAL /HPF
WBC UA: 242 /HPF (ref 0–5)

## 2019-04-18 PROCEDURE — 2580000003 HC RX 258: Performed by: EMERGENCY MEDICINE

## 2019-04-18 PROCEDURE — 84484 ASSAY OF TROPONIN QUANT: CPT

## 2019-04-18 PROCEDURE — 96365 THER/PROPH/DIAG IV INF INIT: CPT

## 2019-04-18 PROCEDURE — 85025 COMPLETE CBC W/AUTO DIFF WBC: CPT

## 2019-04-18 PROCEDURE — 80053 COMPREHEN METABOLIC PANEL: CPT

## 2019-04-18 PROCEDURE — 83605 ASSAY OF LACTIC ACID: CPT

## 2019-04-18 PROCEDURE — 93005 ELECTROCARDIOGRAM TRACING: CPT | Performed by: EMERGENCY MEDICINE

## 2019-04-18 PROCEDURE — 82962 GLUCOSE BLOOD TEST: CPT

## 2019-04-18 PROCEDURE — 71045 X-RAY EXAM CHEST 1 VIEW: CPT

## 2019-04-18 PROCEDURE — 87086 URINE CULTURE/COLONY COUNT: CPT

## 2019-04-18 PROCEDURE — 99285 EMERGENCY DEPT VISIT HI MDM: CPT

## 2019-04-18 PROCEDURE — 6360000002 HC RX W HCPCS: Performed by: EMERGENCY MEDICINE

## 2019-04-18 PROCEDURE — 96366 THER/PROPH/DIAG IV INF ADDON: CPT

## 2019-04-18 PROCEDURE — 87077 CULTURE AEROBIC IDENTIFY: CPT

## 2019-04-18 PROCEDURE — 87186 SC STD MICRODIL/AGAR DIL: CPT

## 2019-04-18 PROCEDURE — 70450 CT HEAD/BRAIN W/O DYE: CPT

## 2019-04-18 PROCEDURE — G0378 HOSPITAL OBSERVATION PER HR: HCPCS

## 2019-04-18 PROCEDURE — 85610 PROTHROMBIN TIME: CPT

## 2019-04-18 PROCEDURE — 87040 BLOOD CULTURE FOR BACTERIA: CPT

## 2019-04-18 PROCEDURE — 81001 URINALYSIS AUTO W/SCOPE: CPT

## 2019-04-18 PROCEDURE — 85730 THROMBOPLASTIN TIME PARTIAL: CPT

## 2019-04-18 RX ORDER — LORAZEPAM 1 MG/1
1 TABLET ORAL EVERY 6 HOURS PRN
COMMUNITY
End: 2022-07-22 | Stop reason: ALTCHOICE

## 2019-04-18 RX ORDER — 0.9 % SODIUM CHLORIDE 0.9 %
500 INTRAVENOUS SOLUTION INTRAVENOUS ONCE
Status: COMPLETED | OUTPATIENT
Start: 2019-04-18 | End: 2019-04-19

## 2019-04-18 RX ADMIN — SODIUM CHLORIDE 500 ML: 9 INJECTION, SOLUTION INTRAVENOUS at 22:39

## 2019-04-18 RX ADMIN — CEFTRIAXONE 1 G: 1 INJECTION, POWDER, FOR SOLUTION INTRAMUSCULAR; INTRAVENOUS at 22:39

## 2019-04-18 NOTE — ED TRIAGE NOTES
AMS, and slurred speech, pt from [facility], last known well 04/17/2018. Previous stroke in 2016 resulting in right side weakness.

## 2019-04-18 NOTE — ED PROVIDER NOTES
Triage Chief Complaint:   Altered Mental Status      [place]:  Chino Menchaca is a 80 y.o. female that presents to the emergency department with fever at nursing home, confusion. Nursing home felt that she seemed off balance and not herself. Stated her speech was slow the last known well was midday sometime yesterday. Family hasn't seen her in several days. Son states her speech was definitely different today however nurse states that this has been going on since yesterday. Stroke alert not called secondary to symptoms being over 24 hours. Per family nursing home stated that she had a fever up to 104. Did tell a couple days ago that she had some blood in her urine. Patient is confused per family. She is denying any pain. .    Past Medical History:   Diagnosis Date    Anxiety     Asymptomatic PVCs     Noted during hospital stay for TIA - No symptoms and No treatment    Breast cancer ([facility] [address]     Essential hypertension, benign     Mixed hyperlipidemia     S/P mastectomy     Senile osteoporosis     Seroma, postoperative     TIA     TIA     Past Surgical History:   Procedure Laterality Date    BREAST BIOPSY Left     COLONOSCOPY      ENDOSCOPY, COLON, DIAGNOSTIC      HYSTERECTOMY      KARL AND BSO Left      Family History   Problem Relation Age of Onset    High Blood Pressure Mother     Heart Disease Father      Social History     Socioeconomic History    Marital status:      Spouse name: Not on file    Number of children: Not on file    Years of education: Not on file    Highest education level: Not on file   Occupational History    Not on file   Social Needs    Financial resource strain: Not on file    Food insecurity:     Worry: Not on file     Inability: Not on file    Transportation needs:     Medical: Not on file     Non-medical: Not on file   Tobacco Use    Smoking status: Former Smoker     Last attempt to quit: 2/10/1975     Years since quittin.2    Smokeless tobacco: Never Used   Substance and Sexual Activity    Alcohol use: No    Drug use: No    Sexual activity: Never   Lifestyle    Physical activity:     Days per week: Not on file     Minutes per session: Not on file    Stress: Not on file   Relationships    Social connections:     Talks on phone: Not on file     Gets together: Not on file     Attends Orthodox service: Not on file     Active member of club or organization: Not on file     Attends meetings of clubs or organizations: Not on file     Relationship status: Not on file    Intimate partner violence:     Fear of current or ex partner: Not on file     Emotionally abused: Not on file     Physically abused: Not on file     Forced sexual activity: Not on file   Other Topics Concern    Not on file   Social History Narrative    Not on file     Current Facility-Administered Medications   Medication Dose Route Frequency Provider Last Rate Last Dose    cefTRIAXone (ROCEPHIN) 1 g IVPB in 50 mL D5W minibag  1 g Intravenous Once Rodo Celis  mL/hr at 04/18/19 2239 1 g at 04/18/19 2239    0.9 % sodium chloride bolus  500 mL Intravenous Once Rodo Celis  mL/hr at 04/18/19 2239 500 mL at 04/18/19 2239     Current Outpatient Medications   Medication Sig Dispense Refill    LORazepam (ATIVAN) 1 MG tablet Take 1 mg by mouth every 6 hours as needed for Anxiety (Unknown dosage).       atorvastatin (LIPITOR) 10 MG tablet Take 1 tablet by mouth nightly 30 tablet 0    lisinopril (PRINIVIL;ZESTRIL) 20 MG tablet Take 1 tablet by mouth daily 30 tablet 0    metoprolol tartrate (LOPRESSOR) 25 MG tablet Take 1 tablet by mouth 2 times daily 60 tablet 0    clopidogrel (PLAVIX) 75 MG tablet Take 1 tablet by mouth daily 30 tablet 0    donepezil (ARICEPT) 10 MG tablet Take 10 mg by mouth nightly      Nutritional Supplements (JUICE PLUS FIBRE PO) Take 2 capsules by mouth 2 times daily (Vegetable, Fruit, Jerod)       Mastectomy Bra MISC by Does not apply route. 2 each 0    Fish Oil-Cholecalciferol (FISH OIL + D3) 8915-7119 MG-UNIT CAPS Take  by mouth. Allergies   Allergen Reactions    Macrodantin [Nitrofurantoin Macrocrystal] Swelling     Eye Swelling     Nursing Notes Reviewed    ROS:  Poor historian    Physical Exam:  ED Triage Vitals [04/18/19 1933]   Enc Vitals Group      /81      Pulse 109      Resp 17      Temp 98.2 °F (36.8 °C)      Temp Source Oral      SpO2 95 %      Weight 115 lb (52.2 kg)      Height 5' 7.5\" (1.715 m)      Head Circumference       Peak Flow       Pain Score       Pain Loc       Pain Edu? Excl. in [address]? My pulse oximetry interpretation is which is within the normal range    GENERAL APPEARANCE: Awake and alert. Cooperative. No acute distress. HEAD: Normocephalic. Atraumatic. EYES: EOM's grossly intact. Sclera anicteric. ENT: Mucous membranes are moist. Tolerates saliva. No trismus. NECK: Supple. No meningismus. Trachea midline. HEART: RRR. Radial pulses 2+. LUNGS: Respirations unlabored. CTAB  ABDOMEN: Soft. Non-tender. No guarding or rebound. EXTREMITIES: No acute deformities. No pitting edema. SKIN: Warm and dry. NEUROLOGICAL: No gross facial drooping. Moves all 4 extremities spontaneously. Sensation intact. No pronator drift. Slow speech. Some confusion. PSYCHIATRIC: Normal mood.     I have reviewed and interpreted all of the currently available lab results from this visit (if applicable):  Results for orders placed or performed during the hospital encounter of 04/18/19   CBC Auto Differential   Result Value Ref Range    WBC 12.7 (H) 4.0 - 10.5 K/CU MM    RBC 3.98 (L) 4.2 - 5.4 M/CU MM    Hemoglobin 12.8 12.5 - 16.0 GM/DL    Hematocrit 38.6 37 - 47 %    MCV 97.0 78 - 100 FL    MCH 32.2 (H) 27 - 31 PG    MCHC 33.2 32.0 - 36.0 %    RDW 12.9 11.7 - 14.9 %    Platelets 784 902 - 471 K/CU MM    MPV 9.7 7.5 - 11.1 FL    Differential Type AUTOMATED DIFFERENTIAL     Segs Relative 80.6 (H) 36 - 66 % Lymphocytes % 5.4 (L) 24 - 44 %    Monocytes % 13.3 (H) 0 - 4 %    Eosinophils % 0.0 0 - 3 %    Basophils % 0.2 0 - 1 %    Segs Absolute 10.3 K/CU MM    Lymphocytes # 0.7 K/CU MM    Monocytes # 1.7 K/CU MM    Eosinophils # 0.0 K/CU MM    Basophils # 0.0 K/CU MM    Nucleated RBC % 0.0 %    Total Nucleated RBC 0.0 K/CU MM    Total Immature Neutrophil 0.07 K/CU MM    Immature Neutrophil % 0.5 (H) 0 - 0.43 %   CMP   Result Value Ref Range    Sodium 135 135 - 145 MMOL/L    Potassium 4.1 3.5 - 5.1 MMOL/L    Chloride 98 (L) 99 - 110 mMol/L    CO2 25 21 - 32 MMOL/L    BUN 26 (H) 6 - 23 MG/DL    CREATININE 0.9 0.6 - 1.1 MG/DL    Glucose 142 (H) 70 - 99 MG/DL    Calcium 9.2 8.3 - 10.6 MG/DL    Alb 3.9 3.4 - 5.0 GM/DL    Total Protein 6.9 6.4 - 8.2 GM/DL    Total Bilirubin 0.4 0.0 - 1.0 MG/DL    ALT 13 10 - 40 U/L    AST 22 15 - 37 IU/L    Alkaline Phosphatase 74 40 - 129 IU/L    GFR Non-African American 60 (L) >60 mL/min/1.73m2    GFR African American >60 >60 mL/min/1.73m2    Anion Gap 12 4 - 16   Troponin   Result Value Ref Range    Troponin T <0.010 <0.01 NG/ML   Protime/INR & PTT   Result Value Ref Range    Protime 14.1 (H) 9.12 - 12.5 SECONDS    INR 1.24 INDEX    aPTT 32.8 21.2 - 33.0 SECONDS   Lactic Acid, Plasma   Result Value Ref Range    Lactate 1.3 0.4 - 2.0 mMOL/L   Urinalysis with microscopic   Result Value Ref Range    Color, UA YELLOW YELLOW    Clarity, UA SLIGHTLY CLOUDY (A) CLEAR    Glucose, Urine NEGATIVE NEGATIVE MG/DL    Bilirubin Urine NEGATIVE NEGATIVE MG/DL    Ketones, Urine NEGATIVE NEGATIVE MG/DL    Specific Gravity, UA 1.014 1.001 - 1.035    Blood, Urine MODERATE (A) NEGATIVE    pH, Urine 5.0 5.0 - 8.0    Protein,  (A) NEGATIVE MG/DL    Urobilinogen, Urine NORMAL 0.2 - 1.0 MG/DL    Nitrite Urine, Quantitative POSITIVE (A) NEGATIVE    Leukocyte Esterase, Urine LARGE (A) NEGATIVE    RBC, UA 10 (H) 0 - 6 /HPF    WBC,  (H) 0 - 5 /HPF    Bacteria, UA MANY (A) NEGATIVE /HPF    WBC Clumps, UA MODERATE /HPF    Squam Epithel, UA <1 /HPF    Mucus, UA RARE (A) NEGATIVE HPF    Trichomonas, UA NONE SEEN NONE SEEN /HPF   POCT Glucose   Result Value Ref Range    Glucose 126 mg/dL    QC OK? ok    POCT Glucose   Result Value Ref Range    POC Glucose 126 (H) 70 - 99 MG/DL        Radiographs:  [] Radiologist's Wet Read Report Reviewed:      CT HEAD WO CONTRAST (Final result)   Result time 04/18/19 22:32:35   Final result by Laree Aschoff, MD (04/18/19 22:32:35)                Impression:    No acute intracranial abnormality. Remote left basal ganglia lacunar infarct as well as infarct in the left  corona radiata.  Chronic small vessel white matter ischemic changes. Narrative:    EXAMINATION:  CT OF THE HEAD WITHOUT CONTRAST  4/18/2019 9:57 pm    TECHNIQUE:  CT of the head was performed without the administration of intravenous  contrast. Dose modulation, iterative reconstruction, and/or weight based  adjustment of the mA/kV was utilized to reduce the radiation dose to as low  as reasonably achievable. COMPARISON:  12/21/2016. HISTORY:  ORDERING SYSTEM PROVIDED HISTORY: patient with confusion, slow speech, some  word finding difficulty but last known well was yesterday mid-day  TECHNOLOGIST PROVIDED HISTORY:  Has a \"code stroke\" or \"stroke alert\" been called? ->No  Ordering Physician Provided Reason for Exam: ams    FINDINGS:  BRAIN/VENTRICLES: There is no acute intracranial hemorrhage, mass effect or  midline shift.  No abnormal extra-axial fluid collection.  The gray-white  differentiation is maintained without evidence of an acute infarct.  There is  no evidence of hydrocephalus. Left-sided basal ganglia lacunar infarcts as  well as infarct in the left corona radiata, previously noted on MRI  12/20/2016.  Decreased attenuation in the periventricular and subcortical  white matter consistent with small vessel ischemic change.  Intracranial  atherosclerotic vascular calcification.     ORBITS: The Rocephin IV. CT head does not show any acute findings. EKG is normal, no acute findings. .     Clinical Impression:  1. Confusion    2. Acute cystitis with hematuria        Disposition Vitals:  [unfilled], [unfilled], [unfilled], [unfilled]    Disposition referral (if applicable):  No follow-up provider specified.     Disposition medications (if applicable):  New Prescriptions    No medications on file         (Please note that portions of this note may have been completed with a voice recognition program. Efforts were made to edit the dictations but occasionally words are mis-transcribed.)    MD Guillaume Weeks MD  04/18/19 [id number]

## 2019-04-18 NOTE — ED NOTES
Bed: ED-33  Expected date:   Expected time:   Means of arrival:   Comments:  Medic      Shabbir Sheridan  04/18/19 1931

## 2019-04-19 PROBLEM — E44.0 MODERATE MALNUTRITION (HCC): Chronic | Status: ACTIVE | Noted: 2019-04-19

## 2019-04-19 LAB
ALBUMIN SERPL-MCNC: 3.3 GM/DL (ref 3.4–5)
ALP BLD-CCNC: 65 IU/L (ref 40–128)
ALT SERPL-CCNC: 11 U/L (ref 10–40)
ANION GAP SERPL CALCULATED.3IONS-SCNC: 12 MMOL/L (ref 4–16)
AST SERPL-CCNC: 20 IU/L (ref 15–37)
BILIRUB SERPL-MCNC: 0.4 MG/DL (ref 0–1)
BUN BLDV-MCNC: 23 MG/DL (ref 6–23)
CALCIUM SERPL-MCNC: 9 MG/DL (ref 8.3–10.6)
CHLORIDE BLD-SCNC: 102 MMOL/L (ref 99–110)
CO2: 23 MMOL/L (ref 21–32)
CREAT SERPL-MCNC: 0.8 MG/DL (ref 0.6–1.1)
DIFFERENTIAL TYPE: ABNORMAL
GFR AFRICAN AMERICAN: >60 ML/MIN/1.73M2
GFR NON-AFRICAN AMERICAN: >60 ML/MIN/1.73M2
GLUCOSE BLD-MCNC: 134 MG/DL (ref 70–99)
HCT VFR BLD CALC: 37.9 % (ref 37–47)
HEMOGLOBIN: 12.6 GM/DL (ref 12.5–16)
LYMPHOCYTES ABSOLUTE: 0.4 K/CU MM
LYMPHOCYTES RELATIVE PERCENT: 3 % (ref 24–44)
MCH RBC QN AUTO: 32.6 PG (ref 27–31)
MCHC RBC AUTO-ENTMCNC: 33.2 % (ref 32–36)
MCV RBC AUTO: 98.2 FL (ref 78–100)
MONOCYTES ABSOLUTE: 0.8 K/CU MM
MONOCYTES RELATIVE PERCENT: 6 % (ref 0–4)
PDW BLD-RTO: 12.5 % (ref 11.7–14.9)
PLATELET # BLD: 167 K/CU MM (ref 140–440)
PLT MORPHOLOGY: ABNORMAL
PMV BLD AUTO: 9.4 FL (ref 7.5–11.1)
POTASSIUM SERPL-SCNC: 4.1 MMOL/L (ref 3.5–5.1)
RBC # BLD: 3.86 M/CU MM (ref 4.2–5.4)
RBC # BLD: ABNORMAL 10*6/UL
SEGMENTED NEUTROPHILS ABSOLUTE COUNT: 11.9 K/CU MM
SEGMENTED NEUTROPHILS RELATIVE PERCENT: 91 % (ref 36–66)
SODIUM BLD-SCNC: 137 MMOL/L (ref 135–145)
TOTAL PROTEIN: 5.4 GM/DL (ref 6.4–8.2)
WBC # BLD: 13.1 K/CU MM (ref 4–10.5)

## 2019-04-19 PROCEDURE — 6360000002 HC RX W HCPCS: Performed by: HOSPITALIST

## 2019-04-19 PROCEDURE — 85007 BL SMEAR W/DIFF WBC COUNT: CPT

## 2019-04-19 PROCEDURE — 2580000003 HC RX 258: Performed by: HOSPITALIST

## 2019-04-19 PROCEDURE — 80053 COMPREHEN METABOLIC PANEL: CPT

## 2019-04-19 PROCEDURE — 85027 COMPLETE CBC AUTOMATED: CPT

## 2019-04-19 PROCEDURE — G0378 HOSPITAL OBSERVATION PER HR: HCPCS

## 2019-04-19 PROCEDURE — 1200000000 HC SEMI PRIVATE

## 2019-04-19 PROCEDURE — 6370000000 HC RX 637 (ALT 250 FOR IP): Performed by: HOSPITALIST

## 2019-04-19 PROCEDURE — 36415 COLL VENOUS BLD VENIPUNCTURE: CPT

## 2019-04-19 PROCEDURE — 94761 N-INVAS EAR/PLS OXIMETRY MLT: CPT

## 2019-04-19 PROCEDURE — 96372 THER/PROPH/DIAG INJ SC/IM: CPT

## 2019-04-19 RX ORDER — SODIUM CHLORIDE 0.9 % (FLUSH) 0.9 %
10 SYRINGE (ML) INJECTION PRN
Status: DISCONTINUED | OUTPATIENT
Start: 2019-04-19 | End: 2019-04-22 | Stop reason: HOSPADM

## 2019-04-19 RX ORDER — ATORVASTATIN CALCIUM 10 MG/1
10 TABLET, FILM COATED ORAL NIGHTLY
Status: DISCONTINUED | OUTPATIENT
Start: 2019-04-19 | End: 2019-04-22 | Stop reason: HOSPADM

## 2019-04-19 RX ORDER — SODIUM CHLORIDE 0.9 % (FLUSH) 0.9 %
10 SYRINGE (ML) INJECTION EVERY 12 HOURS SCHEDULED
Status: DISCONTINUED | OUTPATIENT
Start: 2019-04-19 | End: 2019-04-22 | Stop reason: HOSPADM

## 2019-04-19 RX ORDER — DONEPEZIL HYDROCHLORIDE 10 MG/1
10 TABLET, FILM COATED ORAL NIGHTLY
Status: DISCONTINUED | OUTPATIENT
Start: 2019-04-19 | End: 2019-04-22 | Stop reason: HOSPADM

## 2019-04-19 RX ORDER — ONDANSETRON 2 MG/ML
4 INJECTION INTRAMUSCULAR; INTRAVENOUS EVERY 6 HOURS PRN
Status: DISCONTINUED | OUTPATIENT
Start: 2019-04-19 | End: 2019-04-22 | Stop reason: HOSPADM

## 2019-04-19 RX ORDER — SODIUM CHLORIDE 9 MG/ML
INJECTION, SOLUTION INTRAVENOUS CONTINUOUS
Status: DISPENSED | OUTPATIENT
Start: 2019-04-19 | End: 2019-04-20

## 2019-04-19 RX ORDER — LISINOPRIL 20 MG/1
20 TABLET ORAL DAILY
Status: DISCONTINUED | OUTPATIENT
Start: 2019-04-19 | End: 2019-04-22 | Stop reason: HOSPADM

## 2019-04-19 RX ORDER — CLOPIDOGREL BISULFATE 75 MG/1
75 TABLET ORAL DAILY
Status: DISCONTINUED | OUTPATIENT
Start: 2019-04-19 | End: 2019-04-22 | Stop reason: HOSPADM

## 2019-04-19 RX ORDER — LORAZEPAM 1 MG/1
1 TABLET ORAL EVERY 6 HOURS PRN
Status: DISCONTINUED | OUTPATIENT
Start: 2019-04-19 | End: 2019-04-22 | Stop reason: HOSPADM

## 2019-04-19 RX ADMIN — SODIUM CHLORIDE: 9 INJECTION, SOLUTION INTRAVENOUS at 21:45

## 2019-04-19 RX ADMIN — METOPROLOL TARTRATE 25 MG: 25 TABLET ORAL at 21:45

## 2019-04-19 RX ADMIN — CLOPIDOGREL BISULFATE 75 MG: 75 TABLET ORAL at 08:52

## 2019-04-19 RX ADMIN — METOPROLOL TARTRATE 25 MG: 25 TABLET ORAL at 08:52

## 2019-04-19 RX ADMIN — ENOXAPARIN SODIUM 40 MG: 40 INJECTION SUBCUTANEOUS at 08:53

## 2019-04-19 RX ADMIN — ATORVASTATIN CALCIUM 10 MG: 10 TABLET, FILM COATED ORAL at 21:45

## 2019-04-19 RX ADMIN — METOPROLOL TARTRATE 25 MG: 25 TABLET ORAL at 00:57

## 2019-04-19 RX ADMIN — DONEPEZIL HYDROCHLORIDE 10 MG: 10 TABLET, FILM COATED ORAL at 21:45

## 2019-04-19 RX ADMIN — LISINOPRIL 20 MG: 20 TABLET ORAL at 08:52

## 2019-04-19 RX ADMIN — SODIUM CHLORIDE: 9 INJECTION, SOLUTION INTRAVENOUS at 13:59

## 2019-04-19 RX ADMIN — LORAZEPAM 1 MG: 1 TABLET ORAL at 00:57

## 2019-04-19 RX ADMIN — SODIUM CHLORIDE: 9 INJECTION, SOLUTION INTRAVENOUS at 00:57

## 2019-04-19 RX ADMIN — CEFTRIAXONE 1 G: 1 INJECTION, POWDER, FOR SOLUTION INTRAMUSCULAR; INTRAVENOUS at 21:45

## 2019-04-19 RX ADMIN — LORAZEPAM 1 MG: 1 TABLET ORAL at 21:51

## 2019-04-19 ASSESSMENT — PAIN SCALES - GENERAL: PAINLEVEL_OUTOF10: 0

## 2019-04-19 NOTE — PROGRESS NOTES
Received patient by cart to [address] [facility], [place], operated by [facility] room 3023 per Chase Barrera RN from Ed at [facility]. Patient transferred with one assist to bed from [facility]. Cardiac monitoring is on, call light in reach, and bed alarm is on. Patient is alert and oriented x4, slow to respond which is her baseline. See assessment per flow sheets. Two person skin assessment done per Anne Marie Li RN and myself. Patient has no skin issues.

## 2019-04-19 NOTE — PLAN OF CARE
Nutrition Problem: Moderate malnutrition, In context of chronic illness  Intervention: Food and/or Nutrient Delivery: Continue current diet, Start ONS  Nutritional Goals:  Patient will meet at least 75% of estimated nutrient needs during los with meals and supplements provided.

## 2019-04-19 NOTE — H&P
Hospital Medicine History & Physical      PCP: Ester Hutton MD    Date of Admission: 4/18/2019    Date of Service: Pt seen/examined on 04/18/19 placed in observation   Hx taken from patient    Chief Complaint:  She was found at nursing facility to be confused and altered mental status with poor speech but no neural deficits in the urinary tract infection times one day  History Of Present Illness: Thepatient is a 80 y.o. female who presented to the emergency department from the nursing facility. The nursing home felt that she was off balance and not herself and appeared to be more confused. They stated that her speech was slow and they were concerned of some type of infectious process. She was brought here to the emergency department via EMS from the nursing facility and was evaluated by emergency room staff and found to have a urinary tract infection. The patient does admit to some urgency frequency but denies dysuria at this time. This time patient will be placed in observation on the hospitalist service for medical management of urinary tract infection. After receiving some fluid and hydration patient was seen by hospitalist and seen to be awake oriented and speech was clear. Patient was updated on plans for hospitalization and understood plans. Past Medical History:        Diagnosis Date    Anxiety     Asymptomatic PVCs 2012    Noted during hospital stay for TIA - No symptoms and No treatment    Breast cancer ([facility] [address]     Essential hypertension, benign     Mixed hyperlipidemia     S/P mastectomy     Senile osteoporosis     Seroma, postoperative     TIA     TIA       Past Surgical History:        Procedure Laterality Date    BREAST BIOPSY Left     COLONOSCOPY  2005    ENDOSCOPY, COLON, DIAGNOSTIC      HYSTERECTOMY      KARL AND BSO Left        MedicationsPrior to Admission:    Prior to Admission medications    Medication Sig Start Date End Date Taking?  Authorizing Provider   LORazepam (ATIVAN) 1 MG tablet Take 1 mg by mouth every 6 hours as needed for Anxiety (Unknown dosage). Yes Historical Provider, MD   atorvastatin (LIPITOR) 10 MG tablet Take 1 tablet by mouth nightly 1/11/17  Yes YULISA Noguera MD   lisinopril (PRINIVIL;ZESTRIL) 20 MG tablet Take 1 tablet by mouth daily 1/11/17  Yes YULISA Noguera MD   metoprolol tartrate (LOPRESSOR) 25 MG tablet Take 1 tablet by mouth 2 times daily 1/11/17  Yes Josefa Parekh MD   clopidogrel (PLAVIX) 75 MG tablet Take 1 tablet by mouth daily 1/11/17  Yes Josefa Parekh MD   donepezil (ARICEPT) 10 MG tablet Take 10 mg by mouth nightly   Yes Historical Provider, MD   Nutritional Supplements (JUICE PLUS FIBRE PO) Take 2 capsules by mouth 2 times daily (Vegetable, Fruit, Haring)    Yes Historical Provider, MD   Mastectomy Bra MISC by Does not apply route. 3/23/15   Vero Garcia MD   Fish Oil-Cholecalciferol (FISH OIL + D3) 7158-9544 MG-UNIT CAPS Take  by mouth. Historical Provider, MD       Allergies:    Macrodantin [nitrofurantoin macrocrystal]    Social History:    The patient currently lives nursing extended stay facility  TOBACCO:   reports that she quit smoking about 44 years ago. She has never used smokeless tobacco.  ETOH:   reports that she does not drink alcohol. Family History:  Positive as follows:        Problem Relation Age of Onset    High Blood Pressure Mother     Heart Disease Father        REVIEW OF SYSTEMS:   Pertinent positives and negatives  as notedin the HPI and ROS. All other systems reviewed and negative. Review of Systems      PHYSICAL EXAM:  BP (!) 154/66   Pulse 96   Temp 98.2 °F (36.8 °C) (Oral)   Resp 20   Ht 5' 7.5\" (1.715 m)   Wt 115 lb (52.2 kg)   SpO2 96%   BMI 17.75 kg/m²     Physical Exam   Constitutional: She is oriented to person, place, and time. She appears well-developed and well-nourished. HENT:   Head: Normocephalic.    Eyes: Pupils are equal, round, and and plan:    ASSESSMENT/PLAN:  1. Acute metabolic encephalopathy probably due to urinary tract infection-will hydrate patient, IV antibiotics of Rocephin, urine culture is pending, continue to monitor lab studies. Patient is a lot more awake and oriented since she left nursing facility  2. Chronic dementia-Aricept  3. Hypertension-lisinopril, metoprolol, monitor blood pressure  4.  Hyperlipidemia-Lipitor daily      DVT Prophylaxis: lovenox  Diet: No diet orders on file  Code Status: full code       Conor Ying MD

## 2019-04-19 NOTE — PROGRESS NOTES
Nutrition Assessment    Type and Reason for Visit: Initial, Positive Nutrition Screen(wt loss, poor intake, difficulty chewing/swallowing)    Nutrition Recommendations:   · Continue General Diet  · Begin standard and frozen oral nutrition supplements, chocolate, between meals. Nutrition Assessment: Moderate nutrition risk with moderate malnutrition AEB fat/muscle wasting. Pt reports wt loss following her stroke. No intake yet here, but lunch ordered. Likes chocolate oral supplement, will order. Malnutrition Assessment:  · Malnutrition Status: Meets the criteria for moderate malnutrition  · Context: Chronic illness  · Findings of the 6 clinical characteristics of malnutrition (Minimum of 2 out of 6 clinical characteristics is required to make the diagnosis of moderate or severe Protein Calorie Malnutrition based on AND/ASPEN Guidelines):  1. Energy Intake-Less than or equal to 75% of estimated energy requirement, Greater than or equal to 7 days    2. Weight Loss-2% loss or greater, in 3 months  3. Fat Loss-Mild subcutaneous fat loss, Orbital  4. Muscle Loss-Mild muscle mass loss, Temples (temporalis muscle), Clavicles (pectoralis and deltoids)  5. Fluid Accumulation-No significant fluid accumulation,    6.  Strength-Not measured    Nutrition Risk Level: Moderate    Nutrient Needs:  · Estimated Daily Total Kcal: 5154-1340 (30-35 kcal/kg)  · Estimated Daily Protein (g): 51-77 (1-1.5 g/kg)  · Estimated Daily Total Fluid (ml/day): 9449-4839 (1 ml/kcal)    Nutrition Diagnosis:   · Problem:  Moderate malnutrition, In context of chronic illness  · Etiology: related to Insufficient energy/nutrient consumption     Signs and symptoms:  as evidenced by Diet history of poor intake, BMI, Weight loss, Mild muscle loss, Mild loss of subcutaneous fat    Objective Information:  · Wound Type: None  · Current Nutrition Therapies:  · Oral Diet Orders: General   · Oral Diet intake: Unable to assess  · Oral Nutrition Supplement (ONS) Orders: None  · Anthropometric Measures:  · Ht: 5' 7.5\" (171.5 cm)   · Current Body Wt: 112 lb 2.2 oz (50.9 kg)  · Admission Body Wt: 112 lb 14.4 oz (51.2 kg)  · Usual Body Wt: 115 lb 6.4 oz (52.3 kg)(1/3/19)  · % Weight Change:   -3% in 3 months  · Ideal Body Wt: 138 lb (62.6 kg), % Ideal Body 81  · BMI Classification: BMI <18.5 Underweight(17.3)    Nutrition Interventions:   Continue current diet, Start ONS  Continued Inpatient Monitoring, Education Initiated, Coordination of Care    Nutrition Evaluation:   · Evaluation: Goals set   · Goals:   Patient will meet at least 75% of estimated nutrient needs during los with meals and supplements provided.     · Monitoring: Meal Intake, Supplement Intake, Diet Tolerance, Mental Status/Confusion, Weight, Pertinent Labs, Chewing/Swallowing      Electronically signed by Theodore Mora RD, ALPHONSE on 4/19/19 at 12:32 PM    Contact Number: [id number]

## 2019-04-19 NOTE — PLAN OF CARE
Problem: Falls - Risk of:  Goal: Will remain free from falls  Description  Will remain free from falls  Outcome: Ongoing  Goal: Absence of physical injury  Description  Absence of physical injury  Outcome: Ongoing     Problem: Risk for Impaired Skin Integrity  Goal: Tissue integrity - skin and mucous membranes  Description  Structural intactness and normal physiological function of skin and  mucous membranes.   Outcome: Ongoing     Problem: Sensory:  Goal: General experience of comfort will improve  Description  General experience of comfort will improve  Outcome: Ongoing     Problem: Urinary Elimination:  Goal: Signs and symptoms of infection will decrease  Description  Signs and symptoms of infection will decrease  Outcome: Ongoing  Goal: Ability to reestablish a normal urinary elimination pattern will improve - after catheter removal  Description  Ability to reestablish a normal urinary elimination pattern will improve  Outcome: Ongoing  Goal: Complications related to the disease process, condition or treatment will be avoided or minimized  Description  Complications related to the disease process, condition or treatment will be avoided or minimized  Outcome: Ongoing     Problem: Nutrition  Goal: Optimal nutrition therapy  Outcome: Ongoing

## 2019-04-19 NOTE — PROGRESS NOTES
Progress Note (Hospitalist, Internal Medicine)  IDENTIFYING INFORMATION   PATIENT:  Kieran Atkinson  MRN:  [id number]  ADMIT DATE: 4/18/2019  TIME OF EVALUATION: 4/19/2019 3:13 PM      HISTORY OF PRESENT ILLNESS   \"The patient is a 80 y.o. female who presented to the emergency department from the nursing facility. The nursing home felt that she was off balance and not herself and appeared to be more confused. They stated that her speech was slow and they were concerned of some type of infectious process. She was brought here to the emergency department via EMS from the nursing facility and was evaluated by emergency room staff and found to have a urinary tract infection. The patient does admit to some urgency frequency but denies dysuria at this time. This time patient will be placed in observation on the hospitalist service for medical management of urinary tract infection. After receiving some fluid and hydration patient was seen by hospitalist and seen to be awake oriented and speech was clear. \"    SUBJECTIVE     Mental status better  Has some UTI, dysuria, irritative symptoms    MEDICATIONS   Medications Prior to Admission  Medications Prior to Admission: LORazepam (ATIVAN) 1 MG tablet, Take 1 mg by mouth every 6 hours as needed for Anxiety (Unknown dosage). atorvastatin (LIPITOR) 10 MG tablet, Take 1 tablet by mouth nightly  lisinopril (PRINIVIL;ZESTRIL) 20 MG tablet, Take 1 tablet by mouth daily  metoprolol tartrate (LOPRESSOR) 25 MG tablet, Take 1 tablet by mouth 2 times daily  clopidogrel (PLAVIX) 75 MG tablet, Take 1 tablet by mouth daily  donepezil (ARICEPT) 10 MG tablet, Take 10 mg by mouth nightly  Nutritional Supplements (JUICE PLUS FIBRE PO), Take 2 capsules by mouth 2 times daily (Vegetable, Fruit, [place])   Mastectomy Bra MISC, by Does not apply route. Fish Oil-Cholecalciferol (FISH OIL + D3) 4127-3123 MG-UNIT CAPS, Take  by mouth.     Current Medications  Current Facility-Administered Awake  Psych - Appropriate affect/speech. No agitation  Eyes - Eye lids intact. No scleral icterus  ENT - Lips wnl. External ear clear/dry/intact. No thyromegaly on inspection  Neuro - No gross peripheral or central neuro deficits on inspection  Heart - Sinus. RRR. S1 and S2 present. No elevated JVD appreciated  Lung - Adequate air entry b/l, No crackles/wheezes appreciated  GI -  Soft. No guarding/rigidity. BS+   - No CVA/suprapubic tenderness or palpable bladder distension      LABS AND IMAGING   CBC  [unfilled]    Last 3 Hemoglobin  Lab Results   Component Value Date    HGB 12.6 04/19/2019    HGB 12.8 04/18/2019    HGB 14.4 02/15/2017     Last 3 WBC/ANC  Lab Results   Component Value Date    WBC 13.1 04/19/2019    WBC 12.7 04/18/2019    WBC 5.5 02/15/2017     No components found for: GRNLOCTYABS  Last 3 Platelets  No results found for: PLATELET  Chemistry  [unfilled]  [unfilled]  No results found for: LDH  Coagulation Studies  Lab Results   Component Value Date    INR 1.24 04/18/2019     Liver Function Studies  Lab Results   Component Value Date    ALT 11 04/19/2019    AST 20 04/19/2019    ALKPHOS 65 04/19/2019       Recent Imaging    Don Orf #[id number] ([id number]) (80 y.o. F) (Adm: 04/18/19)    [facility] [id number]         Imaging Results (last 7 days)          Procedure Component Value Ref Range Date/Time     CT HEAD WO CONTRAST [[id number]] Collected: 04/18/19 2229     Order Status: Completed Updated: 04/18/19 2234     Narrative:       EXAMINATION:  CT OF THE HEAD WITHOUT CONTRAST  4/18/2019 9:57 pm    TECHNIQUE:  CT of the head was performed without the administration of intravenous  contrast. Dose modulation, iterative reconstruction, and/or weight based  adjustment of the mA/kV was utilized to reduce the radiation dose to as low  as reasonably achievable. COMPARISON:  12/21/2016.     HISTORY:  ORDERING SYSTEM PROVIDED HISTORY: patient with confusion, slow speech, some  word finding difficulty but last known well was yesterday mid-day  TECHNOLOGIST PROVIDED HISTORY:  Has a \"code stroke\" or \"stroke alert\" been called? ->No  Ordering Physician Provided Reason for Exam: ams    FINDINGS:  BRAIN/VENTRICLES: There is no acute intracranial hemorrhage, mass effect or  midline shift.  No abnormal extra-axial fluid collection.  The gray-white  differentiation is maintained without evidence of an acute infarct.  There is  no evidence of hydrocephalus. Left-sided basal ganglia lacunar infarcts as  well as infarct in the left corona radiata, previously noted on MRI  12/20/2016.  Decreased attenuation in the periventricular and subcortical  white matter consistent with small vessel ischemic change.  Intracranial  atherosclerotic vascular calcification. ORBITS: The visualized portion of the orbits demonstrate no acute abnormality. SINUSES: Short air-fluid level in the left maxillary sinus.  The remainder  the visualized paranasal sinuses and mastoid air cells are clear. SOFT TISSUES/SKULL:  No acute abnormality of the visualized skull or soft  tissues.     Impression:       No acute intracranial abnormality.     Remote left basal ganglia lacunar infarct as well as infarct in the left  corona radiata.  Chronic small vessel white matter ischemic changes.     XR CHEST PORTABLE [[id number]] Collected: 04/18/19 2006     Order Status: Completed Updated: 04/18/19 2010     Narrative:       EXAMINATION:  SINGLE XRAY VIEW OF THE CHEST    4/18/2019 8:01 pm    COMPARISON:  12/19/2016    HISTORY:  ORDERING SYSTEM PROVIDED HISTORY: chest pain  TECHNOLOGIST PROVIDED HISTORY:  Reason for exam:->chest pain  Ordering Physician Provided Reason for Exam: chest pain  Acuity: Acute  Type of Exam: Initial  Additional signs and symptoms: none  Relevant Medical/Surgical History: breast ca, TIA    FINDINGS:  Cardiomegaly.  Aortic calcifications.  Normal pulmonary vasculature.  No  focal consolidation, pleural effusion, or

## 2019-04-19 NOTE — CARE COORDINATION
LSW received consult to check if pt needs precert to return to her NH. LSW spoke with pt and pt does not live in a NH. Pt lives at [address] [place] [address]. Pt stated she is very independent. Pt has a walker at the AL. The AL prepares all of pt's meals./  Pt stated she sets up all her own meds. Pt can bath and dress herself. Pt has a PCP and can afford her meds. Pt stated she wants to return to her AL at discharge.

## 2019-04-19 NOTE — ED NOTES
Pt returned from [address] [place] [address], [address] [place] [address]  04/18/19 7590

## 2019-04-20 LAB
ANION GAP SERPL CALCULATED.3IONS-SCNC: 10 MMOL/L (ref 4–16)
BASOPHILS ABSOLUTE: 0 K/CU MM
BASOPHILS RELATIVE PERCENT: 0.3 % (ref 0–1)
BUN BLDV-MCNC: 19 MG/DL (ref 6–23)
CALCIUM SERPL-MCNC: 8.4 MG/DL (ref 8.3–10.6)
CHLORIDE BLD-SCNC: 106 MMOL/L (ref 99–110)
CO2: 23 MMOL/L (ref 21–32)
CREAT SERPL-MCNC: 0.8 MG/DL (ref 0.6–1.1)
DIFFERENTIAL TYPE: ABNORMAL
EOSINOPHILS ABSOLUTE: 0.1 K/CU MM
EOSINOPHILS RELATIVE PERCENT: 0.7 % (ref 0–3)
GFR AFRICAN AMERICAN: >60 ML/MIN/1.73M2
GFR NON-AFRICAN AMERICAN: >60 ML/MIN/1.73M2
GLUCOSE BLD-MCNC: 106 MG/DL (ref 70–99)
HCT VFR BLD CALC: 37.5 % (ref 37–47)
HEMOGLOBIN: 12 GM/DL (ref 12.5–16)
IMMATURE NEUTROPHIL %: 0.4 % (ref 0–0.43)
LYMPHOCYTES ABSOLUTE: 1.4 K/CU MM
LYMPHOCYTES RELATIVE PERCENT: 14.2 % (ref 24–44)
MCH RBC QN AUTO: 32.2 PG (ref 27–31)
MCHC RBC AUTO-ENTMCNC: 32 % (ref 32–36)
MCV RBC AUTO: 100.5 FL (ref 78–100)
MONOCYTES ABSOLUTE: 1.4 K/CU MM
MONOCYTES RELATIVE PERCENT: 14.7 % (ref 0–4)
NUCLEATED RBC %: 0 %
PDW BLD-RTO: 12.6 % (ref 11.7–14.9)
PLATELET # BLD: 155 K/CU MM (ref 140–440)
PMV BLD AUTO: 9.5 FL (ref 7.5–11.1)
POTASSIUM SERPL-SCNC: 3.7 MMOL/L (ref 3.5–5.1)
RBC # BLD: 3.73 M/CU MM (ref 4.2–5.4)
SEGMENTED NEUTROPHILS ABSOLUTE COUNT: 6.6 K/CU MM
SEGMENTED NEUTROPHILS RELATIVE PERCENT: 69.7 % (ref 36–66)
SODIUM BLD-SCNC: 139 MMOL/L (ref 135–145)
TOTAL IMMATURE NEUTOROPHIL: 0.04 K/CU MM
TOTAL NUCLEATED RBC: 0 K/CU MM
WBC # BLD: 9.5 K/CU MM (ref 4–10.5)

## 2019-04-20 PROCEDURE — 2580000003 HC RX 258: Performed by: HOSPITALIST

## 2019-04-20 PROCEDURE — 80048 BASIC METABOLIC PNL TOTAL CA: CPT

## 2019-04-20 PROCEDURE — 85025 COMPLETE CBC W/AUTO DIFF WBC: CPT

## 2019-04-20 PROCEDURE — 1200000000 HC SEMI PRIVATE

## 2019-04-20 PROCEDURE — 6360000002 HC RX W HCPCS: Performed by: HOSPITALIST

## 2019-04-20 PROCEDURE — 6370000000 HC RX 637 (ALT 250 FOR IP): Performed by: HOSPITALIST

## 2019-04-20 PROCEDURE — 36415 COLL VENOUS BLD VENIPUNCTURE: CPT

## 2019-04-20 RX ADMIN — CLOPIDOGREL BISULFATE 75 MG: 75 TABLET ORAL at 09:21

## 2019-04-20 RX ADMIN — ENOXAPARIN SODIUM 40 MG: 40 INJECTION SUBCUTANEOUS at 09:21

## 2019-04-20 RX ADMIN — METOPROLOL TARTRATE 25 MG: 25 TABLET ORAL at 09:21

## 2019-04-20 RX ADMIN — SODIUM CHLORIDE, PRESERVATIVE FREE 10 ML: 5 INJECTION INTRAVENOUS at 09:21

## 2019-04-20 RX ADMIN — CEFTRIAXONE 1 G: 1 INJECTION, POWDER, FOR SOLUTION INTRAMUSCULAR; INTRAVENOUS at 22:56

## 2019-04-20 RX ADMIN — LISINOPRIL 20 MG: 20 TABLET ORAL at 09:21

## 2019-04-20 RX ADMIN — ATORVASTATIN CALCIUM 10 MG: 10 TABLET, FILM COATED ORAL at 22:45

## 2019-04-20 RX ADMIN — METOPROLOL TARTRATE 25 MG: 25 TABLET ORAL at 22:45

## 2019-04-20 RX ADMIN — DONEPEZIL HYDROCHLORIDE 10 MG: 10 TABLET, FILM COATED ORAL at 22:45

## 2019-04-20 RX ADMIN — LORAZEPAM 1 MG: 1 TABLET ORAL at 22:55

## 2019-04-20 RX ADMIN — SODIUM CHLORIDE, PRESERVATIVE FREE 10 ML: 5 INJECTION INTRAVENOUS at 22:45

## 2019-04-20 ASSESSMENT — PAIN SCALES - GENERAL
PAINLEVEL_OUTOF10: 0

## 2019-04-20 NOTE — PLAN OF CARE
Problem: Falls - Risk of:  Goal: Will remain free from falls  Description  Will remain free from falls  4/20/2019 0912 by Adri Cadet RN  Outcome: Met This Shift  4/20/2019 0123 by Mauri Harper RN  Outcome: Ongoing  4/20/2019 0123 by Mauri Harper RN  Outcome: Ongoing  Goal: Absence of physical injury  Description  Absence of physical injury  4/20/2019 0912 by Adri Cadet RN  Outcome: Met This Shift  4/20/2019 0123 by Mauri Harper RN  Outcome: Ongoing  4/20/2019 0123 by Mauri Harper RN  Outcome: Ongoing     Problem: Risk for Impaired Skin Integrity  Goal: Tissue integrity - skin and mucous membranes  Description  Structural intactness and normal physiological function of skin and  mucous membranes.   4/20/2019 0912 by Adri Cadet RN  Outcome: Met This Shift  4/20/2019 0123 by Mauri Harper RN  Outcome: Ongoing  4/20/2019 0123 by Mauri Harper RN  Outcome: Ongoing

## 2019-04-20 NOTE — PROGRESS NOTES
Dose Route Frequency Provider Last Rate Last Dose    atorvastatin (LIPITOR) tablet 10 mg  10 mg Oral Nightly Delisa Borjas MD   10 mg at 04/19/19 2145    clopidogrel (PLAVIX) tablet 75 mg  75 mg Oral Daily Delisa Borjas MD   75 mg at 04/20/19 0921    donepezil (ARICEPT) tablet 10 mg  10 mg Oral Nightly Delisa Borjas MD   10 mg at 04/19/19 2145    lisinopril (PRINIVIL;ZESTRIL) tablet 20 mg  20 mg Oral Daily Delisa Borjas MD   20 mg at 04/20/19 0921    LORazepam (ATIVAN) tablet 1 mg  1 mg Oral Q6H PRN Delisa Borjas MD   1 mg at 04/19/19 2151    metoprolol tartrate (LOPRESSOR) tablet 25 mg  25 mg Oral BID Delisa Borjas MD   25 mg at 04/20/19 0921    sodium chloride flush 0.9 % injection 10 mL  10 mL Intravenous 2 times per day Delisa Borjas MD   10 mL at 04/20/19 0921    sodium chloride flush 0.9 % injection 10 mL  10 mL Intravenous PRN Delisa Borjas MD        magnesium hydroxide (MILK OF MAGNESIA) 400 MG/5ML suspension 30 mL  30 mL Oral Daily PRN Delisa Borjas MD        ondansetron (ZOFRAN) injection 4 mg  4 mg Intravenous Q6H PRN Delisa Borjas MD        enoxaparin (LOVENOX) injection 40 mg  40 mg Subcutaneous Daily Delisa Borjas MD   40 mg at 04/20/19 8151    cefTRIAXone (ROCEPHIN) 1 g in dextrose 5 % 50 mL IVPB  1 g Intravenous Q24H Delisa Borjas MD   Stopped at 04/19/19 2215         Allergies  Allergies   Allergen Reactions    Macrodantin [Nitrofurantoin Macrocrystal] Swelling     Eye Swelling       REVIEW OF SYSTEMS     Within above limitations. 14 point review of systems reviewed. Pertinent positive or negative as per HPI or otherwise negative per 14 point systems review. Reviewed 4/20/2019 at 3:18 PM    PHYSICAL EXAM       Blood pressure 130/80, pulse 88, temperature 97.5 °F (36.4 °C), temperature source Oral, resp. rate 21, height 5' 7.5\" (1.715 m), weight 112 lb 2.2 oz (50.9 kg), SpO2 96 %, not currently breastfeeding. General - Awake  Psych - Appropriate affect/speech.  No agitation  Eyes - Eye lids intact. No scleral icterus  ENT - Lips wnl. External ear clear/dry/intact. No thyromegaly on inspection  Neuro - No gross peripheral or central neuro deficits on inspection  Heart - Sinus. RRR. S1 and S2 present. No elevated JVD appreciated  Lung - Adequate air entry b/l, No crackles/wheezes appreciated  GI -  Soft. No guarding/rigidity. BS+   - No CVA/suprapubic tenderness or palpable bladder distension      LABS AND IMAGING   CBC  [unfilled]    Last 3 Hemoglobin  Lab Results   Component Value Date    HGB 12.0 04/20/2019    HGB 12.6 04/19/2019    HGB 12.8 04/18/2019     Last 3 WBC/ANC  Lab Results   Component Value Date    WBC 9.5 04/20/2019    WBC 13.1 04/19/2019    WBC 12.7 04/18/2019     No components found for: GRNLOCTYABS  Last 3 Platelets  No results found for: PLATELET  Chemistry  [unfilled]  [unfilled]  No results found for: LDH  Coagulation Studies  Lab Results   Component Value Date    INR 1.24 04/18/2019     Liver Function Studies  Lab Results   Component Value Date    ALT 11 04/19/2019    AST 20 04/19/2019    ALKPHOS 65 04/19/2019       Recent Imaging    Rodrigo Coppola #[id number] ([id number]) (80 y.o. F) (Adm: 04/18/19)    [address] [place] [address] [id number]         Imaging Results (last 7 days)          Procedure Component Value Ref Range Date/Time     CT HEAD WO CONTRAST [[id number]] Collected: 04/18/19 2229     Order Status: Completed Updated: 04/18/19 2234     Narrative:       EXAMINATION:  CT OF THE HEAD WITHOUT CONTRAST  4/18/2019 9:57 pm    TECHNIQUE:  CT of the head was performed without the administration of intravenous  contrast. Dose modulation, iterative reconstruction, and/or weight based  adjustment of the mA/kV was utilized to reduce the radiation dose to as low  as reasonably achievable. COMPARISON:  12/21/2016.     HISTORY:  ORDERING SYSTEM PROVIDED HISTORY: patient with confusion, slow speech, some  word finding difficulty but last known well was yesterday mid-day  TECHNOLOGIST PROVIDED HISTORY:  Has a \"code stroke\" or \"stroke alert\" been called? ->No  Ordering Physician Provided Reason for Exam: ams    FINDINGS:  BRAIN/VENTRICLES: There is no acute intracranial hemorrhage, mass effect or  midline shift.  No abnormal extra-axial fluid collection.  The gray-white  differentiation is maintained without evidence of an acute infarct.  There is  no evidence of hydrocephalus. Left-sided basal ganglia lacunar infarcts as  well as infarct in the left corona radiata, previously noted on MRI  12/20/2016.  Decreased attenuation in the periventricular and subcortical  white matter consistent with small vessel ischemic change.  Intracranial  atherosclerotic vascular calcification. ORBITS: The visualized portion of the orbits demonstrate no acute abnormality. SINUSES: Short air-fluid level in the left maxillary sinus.  The remainder  the visualized paranasal sinuses and mastoid air cells are clear. SOFT TISSUES/SKULL:  No acute abnormality of the visualized skull or soft  tissues.     Impression:       No acute intracranial abnormality.     Remote left basal ganglia lacunar infarct as well as infarct in the left  corona radiata.  Chronic small vessel white matter ischemic changes.     XR CHEST PORTABLE [[id number]] Collected: 04/18/19 2006     Order Status: Completed Updated: 04/18/19 2010     Narrative:       EXAMINATION:  SINGLE XRAY VIEW OF THE CHEST    4/18/2019 8:01 pm    COMPARISON:  12/19/2016    HISTORY:  ORDERING SYSTEM PROVIDED HISTORY: chest pain  TECHNOLOGIST PROVIDED HISTORY:  Reason for exam:->chest pain  Ordering Physician Provided Reason for Exam: chest pain  Acuity: Acute  Type of Exam: Initial  Additional signs and symptoms: none  Relevant Medical/Surgical History: breast ca, TIA    FINDINGS:  Cardiomegaly.  Aortic calcifications.  Normal pulmonary vasculature.  No  focal consolidation, pleural effusion, or pneumothorax.  Surgical clips left  axilla.     Impression:       No evidence of acute process.             Relevant labs and imaging reviewed    ASSESSMENT AND PLAN       Acute metabolic encephalopathy 2/2 UTI  - IV rocephin  - IVF  - await urine cx finalization   - anticipate discharge tomorrow - back to AL.  Request OOB, ambulate to avoid deconditioning    Chronic dementia - mild/moderate    HTN  - continue BP med    HLD    TIA    Comes from AL    Lovenox ppx    [facility], Internal Medicine  4/20/2019 at 3:18 PM

## 2019-04-21 LAB
ANION GAP SERPL CALCULATED.3IONS-SCNC: 8 MMOL/L (ref 4–16)
BASOPHILS ABSOLUTE: 0 K/CU MM
BASOPHILS RELATIVE PERCENT: 0.4 % (ref 0–1)
BUN BLDV-MCNC: 12 MG/DL (ref 6–23)
CALCIUM SERPL-MCNC: 8.5 MG/DL (ref 8.3–10.6)
CHLORIDE BLD-SCNC: 105 MMOL/L (ref 99–110)
CO2: 26 MMOL/L (ref 21–32)
CREAT SERPL-MCNC: 0.7 MG/DL (ref 0.6–1.1)
CULTURE: ABNORMAL
CULTURE: ABNORMAL
DIFFERENTIAL TYPE: ABNORMAL
EOSINOPHILS ABSOLUTE: 0.2 K/CU MM
EOSINOPHILS RELATIVE PERCENT: 2.5 % (ref 0–3)
GFR AFRICAN AMERICAN: >60 ML/MIN/1.73M2
GFR NON-AFRICAN AMERICAN: >60 ML/MIN/1.73M2
GLUCOSE BLD-MCNC: 100 MG/DL (ref 70–99)
HCT VFR BLD CALC: 35.7 % (ref 37–47)
HEMOGLOBIN: 11.7 GM/DL (ref 12.5–16)
IMMATURE NEUTROPHIL %: 0.4 % (ref 0–0.43)
LYMPHOCYTES ABSOLUTE: 1.5 K/CU MM
LYMPHOCYTES RELATIVE PERCENT: 22.6 % (ref 24–44)
Lab: ABNORMAL
MCH RBC QN AUTO: 32.1 PG (ref 27–31)
MCHC RBC AUTO-ENTMCNC: 32.8 % (ref 32–36)
MCV RBC AUTO: 97.8 FL (ref 78–100)
MONOCYTES ABSOLUTE: 1 K/CU MM
MONOCYTES RELATIVE PERCENT: 14.1 % (ref 0–4)
NUCLEATED RBC %: 0 %
PDW BLD-RTO: 12.2 % (ref 11.7–14.9)
PLATELET # BLD: 166 K/CU MM (ref 140–440)
PMV BLD AUTO: 9.1 FL (ref 7.5–11.1)
POTASSIUM SERPL-SCNC: 3.7 MMOL/L (ref 3.5–5.1)
RBC # BLD: 3.65 M/CU MM (ref 4.2–5.4)
SEGMENTED NEUTROPHILS ABSOLUTE COUNT: 4 K/CU MM
SEGMENTED NEUTROPHILS RELATIVE PERCENT: 60 % (ref 36–66)
SODIUM BLD-SCNC: 139 MMOL/L (ref 135–145)
SPECIMEN: ABNORMAL
TOTAL COLONY COUNT: ABNORMAL
TOTAL IMMATURE NEUTOROPHIL: 0.03 K/CU MM
TOTAL NUCLEATED RBC: 0 K/CU MM
WBC # BLD: 6.7 K/CU MM (ref 4–10.5)

## 2019-04-21 PROCEDURE — 2580000003 HC RX 258: Performed by: HOSPITALIST

## 2019-04-21 PROCEDURE — 1200000000 HC SEMI PRIVATE

## 2019-04-21 PROCEDURE — 6370000000 HC RX 637 (ALT 250 FOR IP): Performed by: HOSPITALIST

## 2019-04-21 PROCEDURE — 6360000002 HC RX W HCPCS: Performed by: HOSPITALIST

## 2019-04-21 PROCEDURE — 36415 COLL VENOUS BLD VENIPUNCTURE: CPT

## 2019-04-21 PROCEDURE — 80048 BASIC METABOLIC PNL TOTAL CA: CPT

## 2019-04-21 PROCEDURE — 85025 COMPLETE CBC W/AUTO DIFF WBC: CPT

## 2019-04-21 PROCEDURE — 93010 ELECTROCARDIOGRAM REPORT: CPT | Performed by: INTERNAL MEDICINE

## 2019-04-21 RX ADMIN — SODIUM CHLORIDE, PRESERVATIVE FREE 10 ML: 5 INJECTION INTRAVENOUS at 21:32

## 2019-04-21 RX ADMIN — DONEPEZIL HYDROCHLORIDE 10 MG: 10 TABLET, FILM COATED ORAL at 21:31

## 2019-04-21 RX ADMIN — METOPROLOL TARTRATE 25 MG: 25 TABLET ORAL at 21:31

## 2019-04-21 RX ADMIN — ENOXAPARIN SODIUM 40 MG: 40 INJECTION SUBCUTANEOUS at 08:18

## 2019-04-21 RX ADMIN — CLOPIDOGREL BISULFATE 75 MG: 75 TABLET ORAL at 08:18

## 2019-04-21 RX ADMIN — LORAZEPAM 1 MG: 1 TABLET ORAL at 22:10

## 2019-04-21 RX ADMIN — CEFTRIAXONE 1 G: 1 INJECTION, POWDER, FOR SOLUTION INTRAMUSCULAR; INTRAVENOUS at 21:32

## 2019-04-21 RX ADMIN — METOPROLOL TARTRATE 25 MG: 25 TABLET ORAL at 08:18

## 2019-04-21 RX ADMIN — LISINOPRIL 20 MG: 20 TABLET ORAL at 08:18

## 2019-04-21 RX ADMIN — SODIUM CHLORIDE, PRESERVATIVE FREE 10 ML: 5 INJECTION INTRAVENOUS at 09:18

## 2019-04-21 RX ADMIN — ATORVASTATIN CALCIUM 10 MG: 10 TABLET, FILM COATED ORAL at 21:31

## 2019-04-21 ASSESSMENT — PAIN SCALES - GENERAL
PAINLEVEL_OUTOF10: 0

## 2019-04-21 NOTE — PROGRESS NOTES
This nurse voiced concern to patient worried about pt being up in room alone, pt voices that she falls frequantly at home but \"you leave how to fall without getting hurt\". Patient is steady on feet right now but has been witnessed by this nurse to stumble while walking. Offered again to walk in hallway with this nurse but pt refused. Patient voices \"you mean im not following the rules\" (by being up alone) this nurse states \"if your unsteady on your feet but you refuse to have help then all I can do is document and let your doctor know\". Patient currently sitting in chair with chair alarm on.

## 2019-04-21 NOTE — PROGRESS NOTES
Patient refusing to not get up without help. This nurse has educated many times this morning the importance of getting help while up. Patient assisted this AM to the bathroom and offered a walk in the [place] but pt states \"I look a mess, I dont Ivone Parker go out there\" chair alarm on pt, this nurse went to check on pt and pt walking around room holding the chair alarm. Patient A&O x4 and is refusing an alarm, ambulating in room by self. Will cont to monitor and check on frequently.

## 2019-04-21 NOTE — PROGRESS NOTES
Elsa Werner MD, [address]                Internal Medicine Hospitalist             Daily Progress  Note   Subjective:     Chief Complaint   Patient presents with    Altered Mental Status     Ms. Pepe Pineda Complains of nil. Objective:    BP (!) 123/54   Pulse 88   Temp 97.4 °F (36.3 °C) (Oral)   Resp 22   Ht 5' 7.5\" (1.715 m)   Wt 115 lb 12.8 oz (52.5 kg)   SpO2 98%   BMI 17.87 kg/m²      Intake/Output Summary (Last 24 hours) at 4/21/2019 1511  Last data filed at 4/21/2019 0918  Gross per 24 hour   Intake 10 ml   Output --   Net 10 ml      Physical Exam:  Heart:  Regular rate and rhythm, normal S1 and S2 in all 4 auscultatory areas. No rubs  Murmurs or gallops heard. Lungs: Mostly clear to auscultation, decreased breath sounds at bases. No wheezes appreciated no crackles heard. Abdomen: Soft, non distended. Bowel sounds appreciated. No obvious hepato-splenomegaly noted. Non tender, no rebound noted. Extremities: Non tender, no swelling noted, strength 5/5 both legs. CNS: Grossly intact.     Labs:  CBC with Differential:    Lab Results   Component Value Date    WBC 6.7 04/21/2019    RBC 3.65 04/21/2019    HGB 11.7 04/21/2019    HCT 35.7 04/21/2019     04/21/2019    MCV 97.8 04/21/2019    MCH 32.1 04/21/2019    MCHC 32.8 04/21/2019    RDW 12.2 04/21/2019    SEGSPCT 60.0 04/21/2019    LYMPHOPCT 22.6 04/21/2019    MONOPCT 14.1 04/21/2019    BASOPCT 0.4 04/21/2019    MONOSABS 1.0 04/21/2019    LYMPHSABS 1.5 04/21/2019    EOSABS 0.2 04/21/2019    BASOSABS 0.0 04/21/2019    DIFFTYPE AUTOMATED DIFFERENTIAL 04/21/2019     BMP:    Lab Results   Component Value Date     04/21/2019    K 3.7 04/21/2019     04/21/2019    CO2 26 04/21/2019    BUN 12 04/21/2019    LABALBU 3.3 04/19/2019    CREATININE 0.7 04/21/2019    CALCIUM 8.5 04/21/2019    GFRAA >60 04/21/2019    LABGLOM >60 04/21/2019    GLUCOSE 100 04/21/2019     Recent Labs     04/18/19  2018   TROPONINT <0.010     Lab Results   Component Value Date    [facility] 1.300 02/15/2017           atorvastatin  10 mg Oral Nightly    clopidogrel  75 mg Oral Daily    donepezil  10 mg Oral Nightly    lisinopril  20 mg Oral Daily    metoprolol tartrate  25 mg Oral BID    sodium chloride flush  10 mL Intravenous 2 times per day    enoxaparin  40 mg Subcutaneous Daily    cefTRIAXone (ROCEPHIN) IV  1 g Intravenous Q24H         Assessment:       Patient Active Problem List    Diagnosis Date Noted    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 12/09/2012     Priority: High    Pneumonia 12/08/2012     Priority: High    Confusion 12/08/2012     Priority: High    Essential hypertension, benign 12/08/2012     Priority: Medium    Moderate malnutrition ([facility] [address] 04/19/2019    Encephalopathy 04/18/2019    Dysphagia due to recent cerebral infarction 12/23/2016    Dysarthria due to recent cerebral infarction 12/23/2016    Acute right hemiparesis ([address] 12/23/2016    Gait disturbance 12/23/2016    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 Request OOB, ambulate to avoid deconditioning  4/21/19- improving and once we have c/s back she could be discharged on appropriate antibiotics. Dementia / Old CVA   4/20/19-Chronic dementia - mild/moderate     HTN  4/20/19- continue BP med  4/21/19- b/p is 123/54. Continue to monitor. General Orders:  Repeat basic labs again in am.  I have explained to the patient and discussed with him/her the treatment plan.   Jaquelin Sullivan MD, Adria Taylor

## 2019-04-22 VITALS
HEART RATE: 69 BPM | DIASTOLIC BLOOD PRESSURE: 68 MMHG | WEIGHT: 115.9 LBS | TEMPERATURE: 97.5 F | RESPIRATION RATE: 17 BRPM | BODY MASS INDEX: 17.56 KG/M2 | OXYGEN SATURATION: 100 % | SYSTOLIC BLOOD PRESSURE: 158 MMHG | HEIGHT: 68 IN

## 2019-04-22 LAB
ANION GAP SERPL CALCULATED.3IONS-SCNC: 10 MMOL/L (ref 4–16)
BASOPHILS ABSOLUTE: 0 K/CU MM
BASOPHILS RELATIVE PERCENT: 0.4 % (ref 0–1)
BUN BLDV-MCNC: 14 MG/DL (ref 6–23)
CALCIUM SERPL-MCNC: 8.7 MG/DL (ref 8.3–10.6)
CHLORIDE BLD-SCNC: 103 MMOL/L (ref 99–110)
CO2: 27 MMOL/L (ref 21–32)
CREAT SERPL-MCNC: 0.8 MG/DL (ref 0.6–1.1)
DIFFERENTIAL TYPE: ABNORMAL
EOSINOPHILS ABSOLUTE: 0.2 K/CU MM
EOSINOPHILS RELATIVE PERCENT: 2.8 % (ref 0–3)
GFR AFRICAN AMERICAN: >60 ML/MIN/1.73M2
GFR NON-AFRICAN AMERICAN: >60 ML/MIN/1.73M2
GLUCOSE BLD-MCNC: 91 MG/DL (ref 70–99)
HCT VFR BLD CALC: 36.3 % (ref 37–47)
HEMOGLOBIN: 11.7 GM/DL (ref 12.5–16)
IMMATURE NEUTROPHIL %: 0.4 % (ref 0–0.43)
LYMPHOCYTES ABSOLUTE: 1.6 K/CU MM
LYMPHOCYTES RELATIVE PERCENT: 24.2 % (ref 24–44)
MCH RBC QN AUTO: 31.9 PG (ref 27–31)
MCHC RBC AUTO-ENTMCNC: 32.2 % (ref 32–36)
MCV RBC AUTO: 98.9 FL (ref 78–100)
MONOCYTES ABSOLUTE: 0.7 K/CU MM
MONOCYTES RELATIVE PERCENT: 10.8 % (ref 0–4)
NUCLEATED RBC %: 0 %
PDW BLD-RTO: 12.1 % (ref 11.7–14.9)
PLATELET # BLD: 178 K/CU MM (ref 140–440)
PMV BLD AUTO: 9.4 FL (ref 7.5–11.1)
POTASSIUM SERPL-SCNC: 3.6 MMOL/L (ref 3.5–5.1)
RBC # BLD: 3.67 M/CU MM (ref 4.2–5.4)
SEGMENTED NEUTROPHILS ABSOLUTE COUNT: 4.1 K/CU MM
SEGMENTED NEUTROPHILS RELATIVE PERCENT: 61.4 % (ref 36–66)
SODIUM BLD-SCNC: 140 MMOL/L (ref 135–145)
TOTAL IMMATURE NEUTOROPHIL: 0.03 K/CU MM
TOTAL NUCLEATED RBC: 0 K/CU MM
WBC # BLD: 6.7 K/CU MM (ref 4–10.5)

## 2019-04-22 PROCEDURE — 6370000000 HC RX 637 (ALT 250 FOR IP): Performed by: HOSPITALIST

## 2019-04-22 PROCEDURE — 80048 BASIC METABOLIC PNL TOTAL CA: CPT

## 2019-04-22 PROCEDURE — 36415 COLL VENOUS BLD VENIPUNCTURE: CPT

## 2019-04-22 PROCEDURE — 6360000002 HC RX W HCPCS: Performed by: HOSPITALIST

## 2019-04-22 PROCEDURE — 85025 COMPLETE CBC W/AUTO DIFF WBC: CPT

## 2019-04-22 PROCEDURE — 94761 N-INVAS EAR/PLS OXIMETRY MLT: CPT

## 2019-04-22 RX ORDER — AMLODIPINE BESYLATE 10 MG/1
10 TABLET ORAL EVERY EVENING
Qty: 30 TABLET | Refills: 0 | Status: SHIPPED | OUTPATIENT
Start: 2019-04-22 | End: 2022-07-22 | Stop reason: ALTCHOICE

## 2019-04-22 RX ORDER — CIPROFLOXACIN 500 MG/1
500 TABLET, FILM COATED ORAL 2 TIMES DAILY
Qty: 10 TABLET | Refills: 0 | Status: SHIPPED | OUTPATIENT
Start: 2019-04-22 | End: 2019-04-27

## 2019-04-22 RX ADMIN — CLOPIDOGREL BISULFATE 75 MG: 75 TABLET ORAL at 11:43

## 2019-04-22 RX ADMIN — LISINOPRIL 20 MG: 20 TABLET ORAL at 11:43

## 2019-04-22 RX ADMIN — METOPROLOL TARTRATE 25 MG: 25 TABLET ORAL at 11:43

## 2019-04-22 RX ADMIN — ENOXAPARIN SODIUM 40 MG: 40 INJECTION SUBCUTANEOUS at 11:44

## 2019-04-22 ASSESSMENT — PAIN SCALES - GENERAL
PAINLEVEL_OUTOF10: 0
PAINLEVEL_OUTOF10: 0

## 2019-04-22 NOTE — DISCHARGE SUMMARY
Jarvis Bonnie 1935 [id number]  PCP:  Moody Garcia MD    Admit date: 4/18/2019  Admitting Physician: Delia Duran MD    Discharge date: 4/22/2019 Discharge Physician: Saida Jones MD      Reason for admission:   Chief Complaint   Patient presents with    Altered Mental Status     Present on Admission:   Encephalopathy   Moderate malnutrition [facility])       Discharge Diagnoses & Hospital Course[de-identified]        Acute metabolic encephalopathy 2/2 UTI  - IV rocephin  - urine came back with E.coli susceptible to cipro - will complete 5 more days of therapy     Chronic dementia - mild/moderate     HTN  - continue BP med  - BP readings noted nocturnal HTN in 180s. Will therefore add qHS norvasc for better control     HLD     TIA     Home to [place] with Asaf Manley      Discharge instructions    Complete 5 days of cipro  Add norvasc qHS for better nocturnal BP control  Follow up with PCP in 1 week for post hospitalization care    Exam:   Wt Readings from Last 3 Encounters:   04/22/19 115 lb 14.4 oz (52.6 kg)   01/03/19 115 lb 6.4 oz (52.3 kg)   01/09/17 111 lb (50.3 kg)       Blood pressure (!) 180/66, pulse 69, temperature 95.3 °F (35.2 °C), temperature source Oral, resp. rate 18, height 5' 7.5\" (1.715 m), weight 115 lb 14.4 oz (52.6 kg), SpO2 100 %, not currently breastfeeding. General - AAO x 3  Psych - Appropriate affect/speech. No agitation  Eyes - Eye lids intact. No scleral icterus  ENT - Lips wnl. External ear clear/dry/intact. No thyromegaly on inspection  Heart - Sinus. RRR. S1 and S2 present. No elevated JVD appreciated  Lung - Adequate air entry b/l, No crackles/wheezes appreciated  GI -  Soft. No guarding/rigidity. No hepatosplenomegaly/ascites.  BS+   - No CVA/suprapubic tenderness or palpable bladder distension          Significant Diagnostic Studies:   CBC:   Recent Labs     04/20/19  0424 04/21/19  0333 04/22/19  0401   WBC 9.5 6.7 6.7   HGB 12.0* 11.7* 11.7*    166 178     WBC   Date/Time Value Ref Range Status   04/22/2019 04:01 AM 6.7 4.0 - 10.5 K/CU MM Final   04/21/2019 03:33 AM 6.7 4.0 - 10.5 K/CU MM Final   04/20/2019 04:24 AM 9.5 4.0 - 10.5 K/CU MM Final     Hemoglobin   Date/Time Value Ref Range Status   04/22/2019 04:01 AM 11.7 (L) 12.5 - 16.0 GM/DL Final   04/21/2019 03:33 AM 11.7 (L) 12.5 - 16.0 GM/DL Final   04/20/2019 04:24 AM 12.0 (L) 12.5 - 16.0 GM/DL Final     Platelets   Date/Time Value Ref Range Status   04/22/2019 04:01  140 - 440 K/CU MM Final   04/21/2019 03:33  140 - 440 K/CU MM Final   04/20/2019 04:24  140 - 440 K/CU MM Final    CMP:  Recent Labs     04/20/19  0424 04/21/19  0333 04/22/19  0401    139 140   K 3.7 3.7 3.6    105 103   CO2 23 26 27   BUN 19 12 14   CREATININE 0.8 0.7 0.8   CALCIUM 8.4 8.5 8.7     Troponin: No results for input(s): TROPONINI in the last 72 hours. BNP: No results for input(s): BNP in the last 72 hours. Lipids: No results for input(s): CHOL, HDL in the last 72 hours. Invalid input(s): LDLCALCU  ABGs:No results for input(s): PH, OYR1GFT, PO2ART, BE, WHR2YBE, CO2CT, O2SAT, LABCARB in the last 72 hours. Invalid input(s): METHGBART    Glucose: No results for input(s): POCGLU in the last 72 hours. Magnesium: No results for input(s): MG in the last 72 hours. Phosphorus:No results for input(s): PHOS in the last 72 hours. INR: No results for input(s): INR in the last 72 hours.       Patient Instructions:   [address] Medication Instructions [id number]    Printed on:04/22/19 1053   Medication Information                      atorvastatin (LIPITOR) 10 MG tablet  Take 1 tablet by mouth nightly             ciprofloxacin (CIPRO) 500 MG tablet  Take 1 tablet by mouth 2 times daily for 5 days             clopidogrel (PLAVIX) 75 MG tablet  Take 1 tablet by mouth daily             donepezil (ARICEPT) 10 MG tablet  Take 10 mg by mouth nightly             Fish Oil-Cholecalciferol (FISH OIL + D3) 0192-5197 MG-UNIT CAPS  Take  by mouth.             lisinopril (PRINIVIL;ZESTRIL) 20 MG tablet  Take 1 tablet by mouth daily             LORazepam (ATIVAN) 1 MG tablet  Take 1 mg by mouth every 6 hours as needed for Anxiety (Unknown dosage). Mastectomy Bra MISC  by Does not apply route. metoprolol tartrate (LOPRESSOR) 25 MG tablet  Take 1 tablet by mouth 2 times daily             Nutritional Supplements (JUICE PLUS FIBRE PO)  Take 2 capsules by mouth 2 times daily (Vegetable, Fruit, Jerod)                   Code Status: Full Code     Consults:   IP CONSULT TO HOSPITALIST  IP CONSULT TO CASE MANAGEMENT    Diet: cardiac diet    Activity: activity as tolerated   Work:    Discharged Condition: fair    Prognosis: Fair    Disposition: home      Follow-up with     Follow-up With  Details  Why  Contact Info   Janet Florez MD  Schedule an appointment as soon as possible for a visit in 1 week  for post hospitalization follow up care  [address] [place] [address]  [phone]            Discharge Physician Signed:   [address] [place] [address], Internal medicine  4/22/2019 at 10:55 AM    The patient was seen and examined on day of discharge and this discharge summary is in conjunction with any daily progress note from day of discharge.   Time spent on discharge in the examination, evaluation, counseling and review of medications and discharge plan: <30 minutes    Please forward this discharge summary to patient's PCP

## 2019-04-23 LAB
CULTURE: NORMAL
CULTURE: NORMAL
EKG ATRIAL RATE: 93 BPM
EKG DIAGNOSIS: NORMAL
EKG P AXIS: 28 DEGREES
EKG P-R INTERVAL: 130 MS
EKG Q-T INTERVAL: 360 MS
EKG QRS DURATION: 76 MS
EKG QTC CALCULATION (BAZETT): 447 MS
EKG R AXIS: 56 DEGREES
EKG T AXIS: 74 DEGREES
EKG VENTRICULAR RATE: 93 BPM
Lab: NORMAL
Lab: NORMAL
SPECIMEN: NORMAL
SPECIMEN: NORMAL

## 2019-05-10 NOTE — PROGRESS NOTES
CLINICAL PHARMACY NOTE: MEDS TO [address] Select Patient?: No  Total # of Prescriptions Filled: 2   The following medications were delivered to the patient:  · Ciprofloxacin 500mg   · Amlodipine 10mg   Total # of Interventions Completed: 0  Time Spent (min): 15    Additional Documentation: Advancement-Rotation Flap Text: The defect edges were debeveled with a #15c scalpel blade.  Given the location of the defect, shape of the defect and the proximity to free margins an advancement-rotation flap was deemed most appropriate.  Using a sterile surgical marker, an appropriate flap was drawn incorporating the defect and placing the expected incisions within the relaxed skin tension lines where possible. The area thus outlined was incised deep to adipose tissue with a #15c scalpel blade.  The skin margins were undermined to an appropriate distance in all directions utilizing iris scissors.

## 2020-11-04 ENCOUNTER — HOSPITAL ENCOUNTER (EMERGENCY)
Age: 85
Discharge: HOME | End: 2020-11-04
Attending: EMERGENCY MEDICINE
Payer: MEDICARE

## 2020-11-04 ENCOUNTER — APPOINTMENT (OUTPATIENT)
Dept: GENERAL RADIOLOGY | Age: 85
End: 2020-11-04
Payer: MEDICARE

## 2020-11-04 ENCOUNTER — APPOINTMENT (OUTPATIENT)
Dept: CT IMAGING | Age: 85
End: 2020-11-04
Payer: MEDICARE

## 2020-11-04 VITALS
RESPIRATION RATE: 17 BRPM | SYSTOLIC BLOOD PRESSURE: 158 MMHG | DIASTOLIC BLOOD PRESSURE: 87 MMHG | HEIGHT: 67 IN | TEMPERATURE: 98.3 F | WEIGHT: 120 LBS | BODY MASS INDEX: 18.83 KG/M2 | OXYGEN SATURATION: 100 % | HEART RATE: 86 BPM

## 2020-11-04 LAB
ALBUMIN SERPL-MCNC: 3.9 GM/DL (ref 3.4–5)
ALP BLD-CCNC: 65 IU/L (ref 40–129)
ALT SERPL-CCNC: 19 U/L (ref 10–40)
ANION GAP SERPL CALCULATED.3IONS-SCNC: 11 MMOL/L (ref 4–16)
AST SERPL-CCNC: 26 IU/L (ref 15–37)
BACTERIA: NEGATIVE /HPF
BASOPHILS ABSOLUTE: 0.1 K/CU MM
BASOPHILS RELATIVE PERCENT: 0.6 % (ref 0–1)
BILIRUB SERPL-MCNC: 0.6 MG/DL (ref 0–1)
BILIRUBIN URINE: NEGATIVE MG/DL
BLOOD, URINE: NEGATIVE
BUN BLDV-MCNC: 20 MG/DL (ref 6–23)
CALCIUM SERPL-MCNC: 9.5 MG/DL (ref 8.3–10.6)
CHLORIDE BLD-SCNC: 95 MMOL/L (ref 99–110)
CLARITY: CLEAR
CO2: 25 MMOL/L (ref 21–32)
COLOR: ABNORMAL
CREAT SERPL-MCNC: 0.9 MG/DL (ref 0.6–1.1)
DIFFERENTIAL TYPE: ABNORMAL
EOSINOPHILS ABSOLUTE: 0.2 K/CU MM
EOSINOPHILS RELATIVE PERCENT: 1.8 % (ref 0–3)
GFR AFRICAN AMERICAN: >60 ML/MIN/1.73M2
GFR NON-AFRICAN AMERICAN: 60 ML/MIN/1.73M2
GLUCOSE BLD-MCNC: 93 MG/DL (ref 70–99)
GLUCOSE, URINE: NEGATIVE MG/DL
HCG QUALITATIVE: NEGATIVE
HCT VFR BLD CALC: 41.3 % (ref 37–47)
HEMOGLOBIN: 14.2 GM/DL (ref 12.5–16)
IMMATURE NEUTROPHIL %: 0.2 % (ref 0–0.43)
KETONES, URINE: NEGATIVE MG/DL
LACTATE: 1.1 MMOL/L (ref 0.4–2)
LEUKOCYTE ESTERASE, URINE: ABNORMAL
LIPASE: 27 IU/L (ref 13–60)
LYMPHOCYTES ABSOLUTE: 1.9 K/CU MM
LYMPHOCYTES RELATIVE PERCENT: 22.8 % (ref 24–44)
MCH RBC QN AUTO: 33.2 PG (ref 27–31)
MCHC RBC AUTO-ENTMCNC: 34.4 % (ref 32–36)
MCV RBC AUTO: 96.5 FL (ref 78–100)
MONOCYTES ABSOLUTE: 0.9 K/CU MM
MONOCYTES RELATIVE PERCENT: 10.5 % (ref 0–4)
NITRITE URINE, QUANTITATIVE: NEGATIVE
NUCLEATED RBC %: 0 %
PDW BLD-RTO: 12.9 % (ref 11.7–14.9)
PH, URINE: 5 (ref 5–8)
PLATELET # BLD: 192 K/CU MM (ref 140–440)
PMV BLD AUTO: 9.8 FL (ref 7.5–11.1)
POTASSIUM SERPL-SCNC: 4.2 MMOL/L (ref 3.5–5.1)
PROTEIN UA: NEGATIVE MG/DL
RBC # BLD: 4.28 M/CU MM (ref 4.2–5.4)
RBC URINE: <1 /HPF (ref 0–6)
SEGMENTED NEUTROPHILS ABSOLUTE COUNT: 5.4 K/CU MM
SEGMENTED NEUTROPHILS RELATIVE PERCENT: 64.1 % (ref 36–66)
SODIUM BLD-SCNC: 131 MMOL/L (ref 135–145)
SPECIFIC GRAVITY UA: 1.01 (ref 1–1.03)
SQUAMOUS EPITHELIAL: <1 /HPF
TOTAL IMMATURE NEUTOROPHIL: 0.02 K/CU MM
TOTAL NUCLEATED RBC: 0 K/CU MM
TOTAL PROTEIN: 6.6 GM/DL (ref 6.4–8.2)
TRICHOMONAS: ABNORMAL /HPF
TROPONIN T: <0.01 NG/ML
UROBILINOGEN, URINE: NORMAL MG/DL (ref 0.2–1)
WBC # BLD: 8.5 K/CU MM (ref 4–10.5)
WBC UA: 15 /HPF (ref 0–5)

## 2020-11-04 PROCEDURE — 84703 CHORIONIC GONADOTROPIN ASSAY: CPT

## 2020-11-04 PROCEDURE — 99285 EMERGENCY DEPT VISIT HI MDM: CPT

## 2020-11-04 PROCEDURE — 70450 CT HEAD/BRAIN W/O DYE: CPT

## 2020-11-04 PROCEDURE — 71045 X-RAY EXAM CHEST 1 VIEW: CPT

## 2020-11-04 PROCEDURE — 87186 SC STD MICRODIL/AGAR DIL: CPT

## 2020-11-04 PROCEDURE — 87077 CULTURE AEROBIC IDENTIFY: CPT

## 2020-11-04 PROCEDURE — 87088 URINE BACTERIA CULTURE: CPT

## 2020-11-04 PROCEDURE — 80053 COMPREHEN METABOLIC PANEL: CPT

## 2020-11-04 PROCEDURE — 81001 URINALYSIS AUTO W/SCOPE: CPT

## 2020-11-04 PROCEDURE — 93005 ELECTROCARDIOGRAM TRACING: CPT | Performed by: EMERGENCY MEDICINE

## 2020-11-04 PROCEDURE — 84484 ASSAY OF TROPONIN QUANT: CPT

## 2020-11-04 PROCEDURE — 83690 ASSAY OF LIPASE: CPT

## 2020-11-04 PROCEDURE — 87086 URINE CULTURE/COLONY COUNT: CPT

## 2020-11-04 PROCEDURE — 83605 ASSAY OF LACTIC ACID: CPT

## 2020-11-04 PROCEDURE — 85025 COMPLETE CBC W/AUTO DIFF WBC: CPT

## 2020-11-04 NOTE — ED TRIAGE NOTES
Patient presents to ED with abdominal and nausea that happened earlier today, now patient states just does not feel right.

## 2020-11-04 NOTE — ED PROVIDER NOTES
Triage Chief Complaint:   Abdominal Pain (was \"mediating today with God - felt a sudden LUQ pain\" hx stroke and dementia, wants to make sure she didnt have another stroke. )    Chitina:  Jose Carlos Doan is a 80 y.o. female that presents after she had an episode today where she had some epigastric abdominal pain. She also notes that her blood pressure was significantly elevated with a systolic of 214. She denies any chest pain, nausea, vomiting. No dysuria or increased urinary frequency. She is unsure if she had a fever. She explains to me that she had been meditating and talking through the telephone though not actually on the telephone prior to the incident occurring. She states she is meditating to help bring the piece to the world. She states she is \"really connected\" to things and can give this help through meditation. She does have a history of a previous stroke and was previously an RN. She currently lives in an assisted living facility. She believes she took all of her medications this morning. Patient is concerned that she may have had another stroke. Though she has no focal weakness or any weakness for that matter. No numbness or tingling. ROS:   Review of Systems   Constitutional: Negative for chills and fever. HENT: Negative for congestion, rhinorrhea and sore throat. Eyes: Negative for redness and visual disturbance. Respiratory: Negative for cough and shortness of breath. Cardiovascular: Negative for chest pain and leg swelling. Gastrointestinal: Positive for abdominal pain (epigastric as in HPI'). Negative for constipation, diarrhea, nausea and vomiting. Genitourinary: Negative for dysuria and frequency. Musculoskeletal: Negative for arthralgias and back pain. Skin: Negative for rash and wound. Neurological: Negative for syncope and headaches. Psychiatric/Behavioral: Negative for hallucinations and suicidal ideas. The patient is nervous/anxious.         Past Medical Not on file   Social History Narrative    Not on file     No current facility-administered medications for this encounter. Current Outpatient Medications   Medication Sig Dispense Refill    amLODIPine (NORVASC) 10 MG tablet Take 1 tablet by mouth every evening 30 tablet 0    LORazepam (ATIVAN) 1 MG tablet Take 1 mg by mouth every 6 hours as needed for Anxiety (Unknown dosage).  atorvastatin (LIPITOR) 10 MG tablet Take 1 tablet by mouth nightly 30 tablet 0    lisinopril (PRINIVIL;ZESTRIL) 20 MG tablet Take 1 tablet by mouth daily 30 tablet 0    metoprolol tartrate (LOPRESSOR) 25 MG tablet Take 1 tablet by mouth 2 times daily 60 tablet 0    clopidogrel (PLAVIX) 75 MG tablet Take 1 tablet by mouth daily 30 tablet 0    donepezil (ARICEPT) 10 MG tablet Take 10 mg by mouth nightly      Mastectomy Bra MISC by Does not apply route. 2 each 0    Fish Oil-Cholecalciferol (FISH OIL + D3) 9885-2992 MG-UNIT CAPS Take  by mouth.  Nutritional Supplements (JUICE PLUS FIBRE PO) Take 2 capsules by mouth 2 times daily (Vegetable, Fruit, Joffre)        Allergies   Allergen Reactions    Macrodantin [Nitrofurantoin Macrocrystal] Swelling     Eye Swelling       Nursing Notes Reviewed     Physical Exam:   ED Triage Vitals   Enc Vitals Group      BP 11/04/20 1435 (!) 182/69      Pulse 11/04/20 1435 86      Resp 11/04/20 1435 16      Temp 11/04/20 1435 98.3 °F (36.8 °C)      Temp Source 11/04/20 1435 Oral      SpO2 11/04/20 1435 98 %      Weight 11/04/20 1426 115 lb (52.2 kg)      Height 11/04/20 1426 5' 7\" (1.702 m)      Head Circumference --       Peak Flow --       Pain Score --       Pain Loc --       Pain Edu? --       Excl. in [address]? --      BP (!) 158/87   Pulse 86   Temp 98.3 °F (36.8 °C) (Oral)   Resp 17   Ht 5' 7\" (1.702 m)   Wt 120 lb (54.4 kg)   SpO2 100%   BMI 18.79 kg/m²   My pulse ox interpretation is - normal  Physical Exam  Vitals signs and nursing note reviewed.    Constitutional:       General: She is not in acute distress. Appearance: Normal appearance. She is not toxic-appearing or diaphoretic. HENT:      Head: Normocephalic and atraumatic. Eyes:      General:         Right eye: No discharge. Left eye: No discharge. Conjunctiva/sclera: Conjunctivae normal.   Cardiovascular:      Rate and Rhythm: Normal rate and regular rhythm. Pulses: Normal pulses. Radial pulses are 2+ on the right side and 2+ on the left side. Pulmonary:      Effort: Pulmonary effort is normal. No respiratory distress. Breath sounds: No wheezing or rales. Abdominal:      General: There is no distension. Tenderness: There is no abdominal tenderness. There is no guarding or rebound. Musculoskeletal: Normal range of motion. General: No swelling or tenderness. Skin:     General: Skin is warm and dry. Neurological:      General: No focal deficit present. Mental Status: She is alert. Cranial Nerves: No cranial nerve deficit. Psychiatric:         Mood and Affect: Mood is anxious. Behavior: Behavior normal.         Thought Content: Thought content is delusional. Thought content does not include homicidal or suicidal ideation. I have reviewed and interpreted all of the currently available lab results from this visit (if applicable):  Results for orders placed or performed during the hospital encounter of 11/04/20   Culture, Urine    Specimen: Urine, clean catch   Result Value Ref Range    Specimen URINE CLEAN CATCH     Special Requests NONE     Culture Final Report     Culture ESCHERICHIA COLI >100,000 CFU/ml (A)        Susceptibility    Escherichia coli - BACTERIAL SUSCEPTIBILITY PANEL ELOY     ampicillin <=2 Sensitive      ceFAZolin <=4 Sensitive      ceFAZolin Value in next row        (NOTE)NOTE: Cefazolin should only be used for uncomplicated UTI     for E.coli or Klebsiella pneumoniae.      cefepime Value in next row Sensitive       (NOTE)NOTE: Cefazolin should only be used for uncomplicated UTI     for E.coli or Klebsiella pneumoniae. ciprofloxacin Value in next row Sensitive       (NOTE)NOTE: Cefazolin should only be used for uncomplicated UTI     for E.coli or Klebsiella pneumoniae.     ertapenem Value in next row Sensitive       (NOTE)NOTE: Cefazolin should only be used for uncomplicated UTI     for E.coli or Klebsiella pneumoniae. gentamicin Value in next row Sensitive       (NOTE)NOTE: Cefazolin should only be used for uncomplicated UTI     for E.coli or Klebsiella pneumoniae. levofloxacin Value in next row Sensitive       (NOTE)NOTE: Cefazolin should only be used for uncomplicated UTI     for E.coli or Klebsiella pneumoniae. piperacillin-tazobactam Value in next row Sensitive       (NOTE)NOTE: Cefazolin should only be used for uncomplicated UTI     for E.coli or Klebsiella pneumoniae. trimethoprim-sulfamethoxazole Value in next row Sensitive       (NOTE)NOTE: Cefazolin should only be used for uncomplicated UTI     for E.coli or Klebsiella pneumoniae. nitrofurantoin Value in next row Sensitive       (NOTE)NOTE: Cefazolin should only be used for uncomplicated UTI     for E.coli or Klebsiella pneumoniae.    CBC Auto Differential   Result Value Ref Range    WBC 8.5 4.0 - 10.5 K/CU MM    RBC 4.28 4.2 - 5.4 M/CU MM    Hemoglobin 14.2 12.5 - 16.0 GM/DL    Hematocrit 41.3 37 - 47 %    MCV 96.5 78 - 100 FL    MCH 33.2 (H) 27 - 31 PG    MCHC 34.4 32.0 - 36.0 %    RDW 12.9 11.7 - 14.9 %    Platelets 478 882 - 944 K/CU MM    MPV 9.8 7.5 - 11.1 FL    Differential Type AUTOMATED DIFFERENTIAL     Segs Relative 64.1 36 - 66 %    Lymphocytes % 22.8 (L) 24 - 44 %    Monocytes % 10.5 (H) 0 - 4 %    Eosinophils % 1.8 0 - 3 %    Basophils % 0.6 0 - 1 %    Segs Absolute 5.4 K/CU MM    Lymphocytes Absolute 1.9 K/CU MM    Monocytes Absolute 0.9 K/CU MM    Eosinophils Absolute 0.2 K/CU MM    Basophils Absolute 0.1 K/CU MM    Nucleated RBC % 0.0 % Total Nucleated RBC 0.0 K/CU MM    Total Immature Neutrophil 0.02 K/CU MM    Immature Neutrophil % 0.2 0 - 0.43 %   Comprehensive Metabolic Panel w/ Reflex to MG   Result Value Ref Range    Sodium 131 (L) 135 - 145 MMOL/L    Potassium 4.2 3.5 - 5.1 MMOL/L    Chloride 95 (L) 99 - 110 mMol/L    CO2 25 21 - 32 MMOL/L    BUN 20 6 - 23 MG/DL    CREATININE 0.9 0.6 - 1.1 MG/DL    Glucose 93 70 - 99 MG/DL    Calcium 9.5 8.3 - 10.6 MG/DL    Alb 3.9 3.4 - 5.0 GM/DL    Total Protein 6.6 6.4 - 8.2 GM/DL    Total Bilirubin 0.6 0.0 - 1.0 MG/DL    ALT 19 10 - 40 U/L    AST 26 15 - 37 IU/L    Alkaline Phosphatase 65 40 - 129 IU/L    GFR Non-African American 60 (L) >60 mL/min/1.73m2    GFR African American >60 >60 mL/min/1.73m2    Anion Gap 11 4 - 16   Lipase   Result Value Ref Range    Lipase 27 13 - 60 IU/L   Urinalysis   Result Value Ref Range    Color, UA STRAW (A) YELLOW    Clarity, UA CLEAR CLEAR    Glucose, Urine NEGATIVE NEGATIVE MG/DL    Bilirubin Urine NEGATIVE NEGATIVE MG/DL    Ketones, Urine NEGATIVE NEGATIVE MG/DL    Specific Gravity, UA 1.008 1.001 - 1.035    Blood, Urine NEGATIVE NEGATIVE    pH, Urine 5.0 5.0 - 8.0    Protein, UA NEGATIVE NEGATIVE MG/DL    Urobilinogen, Urine NORMAL 0.2 - 1.0 MG/DL    Nitrite Urine, Quantitative NEGATIVE NEGATIVE    Leukocyte Esterase, Urine SMALL (A) NEGATIVE    RBC, UA <1 0 - 6 /HPF    WBC, UA 15 (H) 0 - 5 /HPF    Bacteria, UA NEGATIVE NEGATIVE /HPF    Squam Epithel, UA <1 /HPF    Trichomonas, UA NONE SEEN NONE SEEN /HPF   HCG Qualitative, Serum   Result Value Ref Range    hCG Qual NEGATIVE    Lactic Acid, Plasma   Result Value Ref Range    Lactate 1.1 0.4 - 2.0 mMOL/L   Troponin   Result Value Ref Range    Troponin T <0.010 <0.01 NG/ML   EKG 12 Lead   Result Value Ref Range    Ventricular Rate 81 BPM    Atrial Rate 81 BPM    P-R Interval 138 ms    QRS Duration 78 ms    Q-T Interval 374 ms    QTc Calculation (Bazett) 434 ms    P Axis 84 degrees    R Axis 70 degrees    T Axis 77 degrees    Diagnosis       Normal sinus rhythm with sinus arrhythmia  Possible Left atrial enlargement  Borderline ECG  When compared with ECG of 18-APR-2019 21:51,  premature atrial complexes are no longer present  Confirmed by Directly ([id number]) on 11/5/2020 6:28:11 PM        Radiographs (if obtained):  [] The following radiograph was interpreted by myself in the absence of a radiologist:  [x]Radiologist's Report Reviewed:  XR CHEST PORTABLE   Final Result   No acute pulmonary disease. Calcific atherosclerotic disease aorta. Note: Abdominal pathology cannot be excluded on chest radiograph series. Clinical history describes epigastric pain; imaging of the abdomen may be   indicated. CT HEAD WO CONTRAST   Final Result   No acute intracranial abnormality. Stable compared to the prior study               EKG (if obtained): (All EKG's are interpreted by myself in the absence of a cardiologist)  Normal sinus rhythm with sinus arrhythmia. Rate of eighty-one. NJ interval 138, QRS seventy-eight, QTc 434. No ST elevations or depressions. Normal T waves. Questionable left atrial enlargement. Impression: Nonspecific EKG. When compared to previous EKG from 4/18/2019, there are no significant changes. MDM:  Differential diagnoses considered include but are not limited to gastritis, peptic ulcer disease, cholecystitis, biliary colic, intestinal spasm, pancreatitis, muscle spasm, electrolyte abnormality, urinary tract infection. Basic lab repeated are unremarkable. Lipase is within normal limits, I do not suspect pancreatitis. EKG is not concerning for acute ischemia and troponin is negative. I do not suspect acute coronary syndrome because patient epigastric pain. Chest x-ray shows no acute cardiopulmonary abnormalities. No pneumothorax and no pneumonia. I did obtain CT scan of patient's head which showed no acute intracranial abnormalities.   I do not suspect that she had a stroke causing her abdominal pain today. I explained this to the patient. Patient's urine does have some white blood cells in it but no bacteria. We will send for culture and hold antibiotics at this time. Patient blood pressure has improved while in the emergency department. I suspect her blood pressure was elevated because she was in pain at the time. Will discharge her back to her care facility in stable condition. Recommended close follow-up with her primary care physician. Patient does appear to have some fixed delusions, but they do not seem to be impacting her ability to care for her own self or current physical health at this time. Plan of care explained to patient. Concerning signs and symptoms warranting a return visit to the Emergency Department were explained in detail. All questions and concerns were addressed to the patient's satisfaction. Patient understood and agreed with plan. I did don appropriate PPE (including N95 face mask, protective eye ware/safety glasses, gloves, hair covering, and no isolation gown), as recommended by the health facility/national standard best practice, during my bedside interactions with the patient. The likelihood of other entities in the differential is insufficient to justify any further testing for them. This was explained to the patient. The patient was advised that persistent or worsening symptoms would requirefurther evaluation. Clinical Impression:  1. Abdominal pain, epigastric    2. Anxiety state          Azam Mcclellan MD       Please note that portions of this note may have been complete with a voice recognition program.  Effortswere made to edit the dictations, but occasional words are mis-transcribed.           Azam Mcclellan MD  11/10/20 0887

## 2020-11-04 NOTE — ED NOTES
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 Pt states she has a hx of stroke and wants to make sure she did not have a stroke because her BP was so high. Pt has hx of dementia and lives in assisted living. Pt states that her daughter takes care of her \"buisness\" and got a call about a bill that cost 89C. Pt asked them to charge her next month for that and then again started to mediate. Pt denies pain currently. Pt states she uses a walker at home. Pt states she has been quarantined at the assisted living facility and they spoke to her regarding the risks of going to the ER for non emergent problems but she knows something happened and wants to see if something happened with her brain. Pt states she is just really worried because things were still and then immediate pain.       Linda Barcenas RN  11/04/20 [id number]

## 2020-11-05 PROCEDURE — 93010 ELECTROCARDIOGRAM REPORT: CPT | Performed by: INTERNAL MEDICINE

## 2020-11-06 LAB
CULTURE: ABNORMAL
CULTURE: ABNORMAL
Lab: ABNORMAL
SPECIMEN: ABNORMAL

## 2020-11-10 ASSESSMENT — ENCOUNTER SYMPTOMS
RHINORRHEA: 0
EYE REDNESS: 0
NAUSEA: 0
COUGH: 0
VOMITING: 0
DIARRHEA: 0
BACK PAIN: 0
SHORTNESS OF BREATH: 0
ABDOMINAL PAIN: 1
SORE THROAT: 0
CONSTIPATION: 0

## 2020-11-17 LAB
EKG ATRIAL RATE: 81 BPM
EKG DIAGNOSIS: NORMAL
EKG P AXIS: 84 DEGREES
EKG P-R INTERVAL: 138 MS
EKG Q-T INTERVAL: 374 MS
EKG QRS DURATION: 78 MS
EKG QTC CALCULATION (BAZETT): 434 MS
EKG R AXIS: 70 DEGREES
EKG T AXIS: 77 DEGREES
EKG VENTRICULAR RATE: 81 BPM

## 2021-12-05 ENCOUNTER — HOSPITAL ENCOUNTER (EMERGENCY)
Age: 86
Discharge: HOME | End: 2021-12-05
Attending: STUDENT IN AN ORGANIZED HEALTH CARE EDUCATION/TRAINING PROGRAM
Payer: MEDICARE

## 2021-12-05 ENCOUNTER — APPOINTMENT (OUTPATIENT)
Dept: CT IMAGING | Age: 86
End: 2021-12-05
Payer: MEDICARE

## 2021-12-05 ENCOUNTER — APPOINTMENT (OUTPATIENT)
Dept: GENERAL RADIOLOGY | Age: 86
End: 2021-12-05
Payer: MEDICARE

## 2021-12-05 VITALS
HEIGHT: 68 IN | SYSTOLIC BLOOD PRESSURE: 162 MMHG | OXYGEN SATURATION: 99 % | RESPIRATION RATE: 18 BRPM | DIASTOLIC BLOOD PRESSURE: 97 MMHG | HEART RATE: 76 BPM | BODY MASS INDEX: 18.19 KG/M2 | TEMPERATURE: 97.9 F | WEIGHT: 120 LBS

## 2021-12-05 DIAGNOSIS — S52.502A CLOSED FRACTURE OF DISTAL ENDS OF LEFT RADIUS AND ULNA, INITIAL ENCOUNTER: ICD-10-CM

## 2021-12-05 DIAGNOSIS — S52.615A CLOSED NONDISPLACED FRACTURE OF STYLOID PROCESS OF LEFT ULNA, INITIAL ENCOUNTER: Primary | ICD-10-CM

## 2021-12-05 DIAGNOSIS — S52.602A CLOSED FRACTURE OF DISTAL ENDS OF LEFT RADIUS AND ULNA, INITIAL ENCOUNTER: ICD-10-CM

## 2021-12-05 PROCEDURE — 99285 EMERGENCY DEPT VISIT HI MDM: CPT

## 2021-12-05 PROCEDURE — 70450 CT HEAD/BRAIN W/O DYE: CPT

## 2021-12-05 PROCEDURE — 73110 X-RAY EXAM OF WRIST: CPT

## 2021-12-05 PROCEDURE — 6370000000 HC RX 637 (ALT 250 FOR IP): Performed by: STUDENT IN AN ORGANIZED HEALTH CARE EDUCATION/TRAINING PROGRAM

## 2021-12-05 RX ORDER — ACETAMINOPHEN 500 MG
500 TABLET ORAL 4 TIMES DAILY PRN
Qty: 20 TABLET | Refills: 0 | Status: SHIPPED | OUTPATIENT
Start: 2021-12-05 | End: 2022-07-22 | Stop reason: DRUGHIGH

## 2021-12-05 RX ORDER — ACETAMINOPHEN 500 MG
1000 TABLET ORAL ONCE
Status: COMPLETED | OUTPATIENT
Start: 2021-12-05 | End: 2021-12-05

## 2021-12-05 RX ADMIN — ACETAMINOPHEN 1000 MG: 500 TABLET ORAL at 14:49

## 2021-12-05 ASSESSMENT — PAIN SCALES - GENERAL
PAINLEVEL_OUTOF10: 6
PAINLEVEL_OUTOF10: 3
PAINLEVEL_OUTOF10: 6

## 2021-12-05 ASSESSMENT — PAIN DESCRIPTION - ONSET: ONSET: ON-GOING

## 2021-12-05 ASSESSMENT — PAIN DESCRIPTION - LOCATION: LOCATION: WRIST

## 2021-12-05 ASSESSMENT — PAIN DESCRIPTION - ORIENTATION: ORIENTATION: LEFT

## 2021-12-05 ASSESSMENT — PAIN DESCRIPTION - FREQUENCY: FREQUENCY: CONTINUOUS

## 2021-12-05 ASSESSMENT — PAIN DESCRIPTION - DESCRIPTORS: DESCRIPTORS: ACHING

## 2021-12-05 ASSESSMENT — PAIN DESCRIPTION - PAIN TYPE: TYPE: ACUTE PAIN

## 2021-12-05 ASSESSMENT — PAIN DESCRIPTION - PROGRESSION: CLINICAL_PROGRESSION: NOT CHANGED

## 2021-12-05 NOTE — ED NOTES
Bed: ED-04  Expected date:   Expected time:   Means of arrival:   Comments:  dulce Lynn RN  12/05/21 [id number]

## 2021-12-05 NOTE — ED TRIAGE NOTES
Pt from assisted living  and fell back. Caught herself with L hand FOOSH, L wrist pain caught herself with the wall. A&Ox4 Unsure if hit head, denies pain, denies LOC. On plavix.

## 2021-12-05 NOTE — ED PROVIDER NOTES
Emergency [address] EMERGENCY DEPARTMENT    Patient: Derick Smith  MRN: [id number]  : 1935  Date of Evaluation: 2021  ED Provider: Ruby Tatum MD    Chief Complaint       Chief Complaint   Patient presents with    Fall     L wrist pain. [address] fell backward at mailbox inside  caught on wall     [place]     Derick Smith is a 80 y.o. female who presents to the emergency department presenting after a slip and fall while trying to get her mail. Denies any LOC. Patient takes blood thinners. Reports falling on a stretched out hand. ROS:     At least 10 systems reviewed and otherwise acutely negative except as in the [address]     Past History     Past Medical History:   Diagnosis Date    Anxiety     Asymptomatic PVCs     Noted during hospital stay for TIA - No symptoms and No treatment    Breast cancer ([facility] [address]     Essential hypertension, benign     Mixed hyperlipidemia     S/P mastectomy     Senile osteoporosis     Seroma, postoperative     TIA -    TIA     Past Surgical History:   Procedure Laterality Date    BREAST BIOPSY Left     COLONOSCOPY      ENDOSCOPY, COLON, DIAGNOSTIC      HYSTERECTOMY      KARL AND BSO Left      Social History     Socioeconomic History    Marital status:      Spouse name: None    Number of children: None    Years of education: None    Highest education level: None   Occupational History    None   Tobacco Use    Smoking status: Former Smoker     Quit date: 2/10/1975     Years since quittin.8    Smokeless tobacco: Never Used   Substance and Sexual Activity    Alcohol use: No    Drug use: No    Sexual activity: Never   Other Topics Concern    None   Social History Narrative    None     Social Determinants of Health     Financial Resource Strain:     Difficulty of Paying Living Expenses: Not on file   Food Insecurity:     Worried About Running Out of Food in the Last Year: Not on file    Ran Out of Food in the Last Year: Not on file   Transportation Needs:     Lack of Transportation (Medical): Not on file    Lack of Transportation (Non-Medical): Not on file   Physical Activity:     Days of Exercise per Week: Not on file    Minutes of Exercise per Session: Not on file   Stress:     Feeling of Stress : Not on file   Social Connections:     Frequency of Communication with Friends and Family: Not on file    Frequency of Social Gatherings with Friends and Family: Not on file    Attends Denominational Services: Not on file    Active Member of [address] [place] [address] [employer] or Organizations: Not on file    Attends Club or Organization Meetings: Not on file    Marital Status: Not on file   Intimate Partner Violence:     Fear of Current or Ex-Partner: Not on file    Emotionally Abused: Not on file    Physically Abused: Not on file    Sexually Abused: Not on file   Housing Stability:     Unable to Pay for Housing in the Last Year: Not on file    Number of [place] in the Last Year: Not on file    Unstable Housing in the Last Year: Not on file       Medications/Allergies     Previous Medications    AMLODIPINE (NORVASC) 10 MG TABLET    Take 1 tablet by mouth every evening    ATORVASTATIN (LIPITOR) 10 MG TABLET    Take 1 tablet by mouth nightly    CLOPIDOGREL (PLAVIX) 75 MG TABLET    Take 1 tablet by mouth daily    DONEPEZIL (ARICEPT) 10 MG TABLET    Take 10 mg by mouth nightly    FISH OIL-CHOLECALCIFEROL (FISH OIL + D3) 5311-8566 MG-UNIT CAPS    Take  by mouth. LISINOPRIL (PRINIVIL;ZESTRIL) 20 MG TABLET    Take 1 tablet by mouth daily    LORAZEPAM (ATIVAN) 1 MG TABLET    Take 1 mg by mouth every 6 hours as needed for Anxiety (Unknown dosage). MASTECTOMY BRA MISC    by Does not apply route.     METOPROLOL TARTRATE (LOPRESSOR) 25 MG TABLET    Take 1 tablet by mouth 2 times daily    NUTRITIONAL SUPPLEMENTS (JUICE PLUS FIBRE PO)    Take 2 capsules by mouth 2 times daily (Vegetable, Fruit, Jerod)      Allergies   Allergen Reactions    Macrodantin [Nitrofurantoin Macrocrystal] Swelling     Eye Swelling        Physical Exam       ED Triage Vitals   BP Temp Temp Source Pulse Resp SpO2 Height Weight   12/05/21 1315 12/05/21 1312 12/05/21 1312 12/05/21 1312 12/05/21 1312 12/05/21 1312 12/05/21 1312 12/05/21 1312   (!) 187/110 97.9 °F (36.6 °C) Oral 85 17 96 % 5' 8\" (1.727 m) 120 lb (54.4 kg)     GENERAL APPEARANCE: Awake and alert. Cooperative. No acute distress. HEAD: Normocephalic. Atraumatic. EYES: Sclera anicteric. ENT: Tolerates saliva. No trismus. NECK: Supple. Trachea midline. CARDIO: RRR. Radial pulse 2+. LUNGS: Respirations unlabored. CTAB. ABDOMEN: Soft. Non-distended. Non-tender. EXTREMITIES: No acute deformities. Left wrist:Tenderness upon palpation of the snuffbox, tenderness on distal ulnar. <2 capillary refill on all digits. Able to make an Okay sign, able to make a thumbs up and full ROM of fingers and wrist.   SKIN: Warm and dry. NEUROLOGICAL: No gross facial drooping. Moves all 4 extremities spontaneously. PSYCHIATRIC: Normal mood. Diagnostics   Labs:  No results found for this visit on 12/05/21. Radiographs:  XR WRIST LEFT (MIN 3 VIEWS)    Result Date: 12/5/2021  EXAMINATION: 3 XRAY VIEWS OF THE LEFT WRIST 12/5/2021 2:25 pm COMPARISON: None. HISTORY: ORDERING SYSTEM PROVIDED HISTORY: Injury L wrist TECHNOLOGIST PROVIDED HISTORY: Reason for exam:->Injury L wrist FINDINGS: Acute and minimally displaced fracture of the distal radius centered at the distal metaphysis. Acute minimally displaced fracture of the ulnar styloid. 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 Osseous mineralization is decreased. 1. Acute and minimally displaced fractures of the left distal radius and ulnar styloid. 2. Osteopenia.      CT Head WO Contrast    Result Date: 12/5/2021  EXAMINATION: CT OF THE HEAD WITHOUT CONTRAST  12/5/2021 3:25 pm TECHNIQUE: CT of the head was performed without the administration of intravenous contrast. Dose modulation, iterative reconstruction, and/or weight based adjustment of the mA/kV was utilized to reduce the radiation dose to as low as reasonably achievable. COMPARISON: 11/04/2020 HISTORY: ORDERING SYSTEM PROVIDED HISTORY: trauma TECHNOLOGIST PROVIDED HISTORY: Reason for exam:->trauma Has a \"code stroke\" or \"stroke alert\" been called? ->No Decision Support Exception - unselect if not a suspected or confirmed emergency medical condition->Emergency Medical Condition (MA) Reason for Exam: fall Acuity: Acute Type of Exam: Initial Mechanism of Injury: fall Relevant Medical/Surgical History: none FINDINGS: BRAIN/VENTRICLES: The ventricles and sulci are diffusely enlarged. Low attenuation is seen in the periventricular and subcortical white matter. No acute intracranial hemorrhage or acute infarct is identified. ORBITS: The visualized portion of the orbits demonstrate no acute abnormality. SINUSES: The visualized paranasal sinuses and mastoid air cells demonstrate no acute abnormality. SOFT TISSUES/SKULL:  No acute abnormality of the visualized skull or soft tissues. No acute intracranial abnormality. Diffuse atrophic changes with findings suggesting chronic microvascular ischemia       Procedures/EKG:   Splint placement     ED Course and MDM   In brief, Derick Smith is a 80 y.o. female who presented to the emergency department after a fall were she lost her balance and slipped. Reports hitting her head and being on Plavix. No LOC and no lacerations and abrasions to head or neck. Reports crushing her fall with her hands. Reports left wrist pain but good ROM and neurovascular intact. XR show ulnar and radial fractures minimally displaced. Patient splinted and advised to follow up with orthopedics in a week.      ED Medication Orders (From admission, onward)    Start Ordered     Status Ordering Provider    12/05/21 1445 12/05/21 1434 acetaminophen (TYLENOL) tablet 1,000 mg  ONCE         Last MAR action: Given - by Diandra De Anda on 12/05/21 at [address] [place] Martinez          Final Impression      1. Closed nondisplaced fracture of styloid process of left ulna, initial encounter    2. Closed fracture of distal ends of left radius and ulna, initial encounter      DISPOSITION    Discharge with orthopedic follow up   (Please note that portions of this note may have been completed with a voice recognition program. Efforts were made to edit the dictations but occasionally words are mis-transcribed. )    Alin Wu MD  [address] [place] [address] MD  12/05/21 8758

## 2021-12-05 NOTE — ED NOTES
[phone] called med trans for transportation back to [facility]. ETA 2300. Debra Shows  12/05/21 [phone] called med trans to cancel transportation. Spoke with Enrique Mehta.      Debra Shows  12/05/21 6639

## 2021-12-16 ENCOUNTER — OFFICE VISIT (OUTPATIENT)
Dept: ORTHOPEDIC SURGERY | Age: 86
End: 2021-12-16
Payer: MEDICARE

## 2021-12-16 VITALS
BODY MASS INDEX: 18.19 KG/M2 | RESPIRATION RATE: 16 BRPM | WEIGHT: 120 LBS | OXYGEN SATURATION: 98 % | HEART RATE: 90 BPM | HEIGHT: 68 IN

## 2021-12-16 DIAGNOSIS — S52.552A OTHER CLOSED EXTRA-ARTICULAR FRACTURE OF DISTAL END OF LEFT RADIUS, INITIAL ENCOUNTER: Primary | ICD-10-CM

## 2021-12-16 PROCEDURE — G8484 FLU IMMUNIZE NO ADMIN: HCPCS | Performed by: PHYSICIAN ASSISTANT

## 2021-12-16 PROCEDURE — G8419 CALC BMI OUT NRM PARAM NOF/U: HCPCS | Performed by: PHYSICIAN ASSISTANT

## 2021-12-16 PROCEDURE — 1123F ACP DISCUSS/DSCN MKR DOCD: CPT | Performed by: PHYSICIAN ASSISTANT

## 2021-12-16 PROCEDURE — 1090F PRES/ABSN URINE INCON ASSESS: CPT | Performed by: PHYSICIAN ASSISTANT

## 2021-12-16 PROCEDURE — 1036F TOBACCO NON-USER: CPT | Performed by: PHYSICIAN ASSISTANT

## 2021-12-16 PROCEDURE — 99203 OFFICE O/P NEW LOW 30 MIN: CPT | Performed by: PHYSICIAN ASSISTANT

## 2021-12-16 PROCEDURE — 4040F PNEUMOC VAC/ADMIN/RCVD: CPT | Performed by: PHYSICIAN ASSISTANT

## 2021-12-16 PROCEDURE — 25600 CLTX DST RDL FX/EPHYS SEP WO: CPT | Performed by: PHYSICIAN ASSISTANT

## 2021-12-16 PROCEDURE — G8427 DOCREV CUR MEDS BY ELIG CLIN: HCPCS | Performed by: PHYSICIAN ASSISTANT

## 2021-12-16 ASSESSMENT — ENCOUNTER SYMPTOMS
EYES NEGATIVE: 1
RESPIRATORY NEGATIVE: 1
GASTROINTESTINAL NEGATIVE: 1

## 2021-12-16 NOTE — PROGRESS NOTES
Review of Systems   Constitutional: Negative. HENT: Negative. Eyes: Negative. Respiratory: Negative. Cardiovascular: Negative. Gastrointestinal: Negative. Genitourinary: Negative. Musculoskeletal: Positive for arthralgias and myalgias. Skin: Negative. Neurological: Negative. Psychiatric/Behavioral: Negative. HPI:  Bharathi Valdes is a 80 y.o. female that presents the office today to have her left wrist evaluated after she had an injury 2 weeks ago when she fell out by the mailbox. She states she is not having much pain in the wrist at this time. She is right-hand dominant.       Past Medical History:   Diagnosis Date    Anxiety     Asymptomatic PVCs     Noted during hospital stay for TIA - No symptoms and No treatment    Breast cancer ([facility] [address]     Essential hypertension, benign     Mixed hyperlipidemia     S/P mastectomy     Senile osteoporosis     Seroma, postoperative     TIA     TIA       Past Surgical History:   Procedure Laterality Date    BREAST BIOPSY Left     COLONOSCOPY      ENDOSCOPY, COLON, DIAGNOSTIC      HYSTERECTOMY      KARL AND BSO Left        Family History   Problem Relation Age of Onset    High Blood Pressure Mother     Heart Disease Father        Social History     Socioeconomic History    Marital status:      Spouse name: Not on file    Number of children: Not on file    Years of education: Not on file    Highest education level: Not on file   Occupational History    Not on file   Tobacco Use    Smoking status: Former Smoker     Quit date: 2/10/1975     Years since quittin.8    Smokeless tobacco: Never Used   Substance and Sexual Activity    Alcohol use: No    Drug use: No    Sexual activity: Never   Other Topics Concern    Not on file   Social History Narrative    Not on file     Social Determinants of Health     Financial Resource Strain:     Difficulty of Paying Living Expenses: Not on file   Food Insecurity:     Worried About Running Out of Food in the Last Year: Not on file    Davi of Food in the Last Year: Not on file   Transportation Needs:     Lack of Transportation (Medical): Not on file    Lack of Transportation (Non-Medical): Not on file   Physical Activity:     Days of Exercise per Week: Not on file    Minutes of Exercise per Session: Not on file   Stress:     Feeling of Stress : Not on file   Social Connections:     Frequency of Communication with Friends and Family: Not on file    Frequency of Social Gatherings with Friends and Family: Not on file    Attends Worship Services: Not on file    Active Member of [address] [place] [address] [employer] or Organizations: Not on file    Attends Club or Organization Meetings: Not on file    Marital Status: Not on file   Intimate Partner Violence:     Fear of Current or Ex-Partner: Not on file    Emotionally Abused: Not on file    Physically Abused: Not on file    Sexually Abused: Not on file   Housing Stability:     Unable to Pay for Housing in the Last Year: Not on file    Number of [place] in the Last Year: Not on file    Unstable Housing in the Last Year: Not on file       Current Outpatient Medications   Medication Sig Dispense Refill    acetaminophen (TYLENOL) 500 MG tablet Take 1 tablet by mouth 4 times daily as needed for Pain 20 tablet 0    amLODIPine (NORVASC) 10 MG tablet Take 1 tablet by mouth every evening 30 tablet 0    LORazepam (ATIVAN) 1 MG tablet Take 1 mg by mouth every 6 hours as needed for Anxiety (Unknown dosage).       atorvastatin (LIPITOR) 10 MG tablet Take 1 tablet by mouth nightly 30 tablet 0    lisinopril (PRINIVIL;ZESTRIL) 20 MG tablet Take 1 tablet by mouth daily 30 tablet 0    metoprolol tartrate (LOPRESSOR) 25 MG tablet Take 1 tablet by mouth 2 times daily 60 tablet 0    clopidogrel (PLAVIX) 75 MG tablet Take 1 tablet by mouth daily 30 tablet 0    donepezil (ARICEPT) 10 MG tablet Take 10 mg by mouth nightly      Mastectomy Bra MISC by Does not apply route. 2 each 0    Fish Oil-Cholecalciferol (FISH OIL + D3) 5900-6065 MG-UNIT CAPS Take  by mouth.  Nutritional Supplements (JUICE PLUS FIBRE PO) Take 2 capsules by mouth 2 times daily (Vegetable, Fruit, Jerod)        No current facility-administered medications for this visit. Allergies   Allergen Reactions    Macrodantin [Nitrofurantoin Macrocrystal] Swelling     Eye Swelling       Review of Systems:  See above      Physical Exam:  Ms. Governor Hammans most recent vitals:  Vitals  Pulse: 90  Resp: 16  SpO2: 98 %  Height: 5' 8\" (172.7 cm)  Weight: 120 lb (54.4 kg)    Gait is normal    Gen/Psych: Examination reveals a pleasantindividual in no acute distress. The patient is oriented to time, place and person. The patient's mood and affect are appropriate.     Lymph: The lymphatic examination bilaterally reveals all areas to be without enlargement or induration.      Skin intact without lymphadenopathy, discoloration, or abnormal temperature.      Vascular: There is intact, symmetric circulation in both upper extremities. left wrist exam:   Edema is minimal in the distal radius. There mild clinical evidence of skeletal deformity or mal-alignment  Maximal pain is elicited with palpation of the distal radius and ulna. .    Sensation is intact to light touch. There is no evidence of gross joint instability. Patient has active range of motion of the left wrist with minimal pain. Xray review:  Studies reviewed which showed a fracture of the ulnar styloid tip and extra-articular distal radius fracture. 3 views of the left wrist taken and reviewed in the office today show mildly impacted, shortened distal radius fracture and tip of the ulnar styloid fracture. There is no dorsal or volar angulation to the fracture of the distal radius. Overall no significant interval change compared to x-rays taken on December 5.   The official read and interpretation of these x-rays will be done by the the [facility]       Impression:   Diagnosis Orders   1. Other closed extra-articular fracture of distal end of left radius, initial encounter           Plan:  I did discuss with her today that she does have some shortening of the fracture and some mild deformity noted just on visual exam however given her age and her relative minimal pain that she is experiencing now and the range of motion she still has we could treat this nonoperatively. We had a lengthy discussion about the diagnosis and treatment. We discussed the treatment options for this fracture, both non-operative and operative. After discussing the risks and benefits of the different treatment options, we will proceed with:  Cast: short arm  follow-up appointment for 4 weeks for x-ray out of cast.      She is specifically instructed to contact the office between now & her scheduled appointment if she has concerns related to the fracture. She is welcome to call for an appointmentsooner if she has any additional concerns or questions. The patient and all family members present understand the above and desire to proceed with the plan. Cast Application - left upper extremity:  · An appropriately padded, well molded short arm cast is applied to the patient. The patient reports no immediate discomfort from the cast.  Cast care instructions are provided. Cast Care Instructions:  · Inspect the cast regularly. If it becomes cracked or develops soft spots, contact office. · Inspect skin around the cast regularly. If skin becomes red or raw around the cast, contact office. · Do not break off rough edges of the cast or trim the cast.  Do not modify the cast.    · Do not pull out the padding from the cast.  · Do not put foreign objects under the cast.  · Do not walk on the cast or place body weight through the cast.  · Keep dirt, sand, and powder away from the inside of the cast.  · Keep the cast clean and DRY!   · Return with any cast problems.

## 2021-12-16 NOTE — PATIENT INSTRUCTIONS
Nonweightbearing  Cast applied in office today  Please do not get cast wet  Ice and elevate as needed  Tylenol or Motrin for pain  Follow up in 4 weeks for x-rays / x-rays out of cast    Cast Application - left upper extremity:  · An appropriately padded, well molded short arm cast is applied to the patient. The patient reports no immediate discomfort from the cast.  Cast care instructions are provided. Cast Care Instructions:  · Inspect the cast regularly. If it becomes cracked or develops soft spots, contact office. · Inspect skin around the cast regularly. If skin becomes red or raw around the cast, contact office. · Do not break off rough edges of the cast or trim the cast.  Do not modify the cast.    · Do not pull out the padding from the cast.  · Do not put foreign objects under the cast.  · Do not walk on the cast or place body weight through the cast.  · Keep dirt, sand, and powder away from the inside of the cast.  · Keep the cast clean and DRY! · Return with any cast problems.

## 2022-01-13 ENCOUNTER — OFFICE VISIT (OUTPATIENT)
Dept: ORTHOPEDIC SURGERY | Age: 87
End: 2022-01-13
Payer: MEDICARE

## 2022-01-13 VITALS
RESPIRATION RATE: 16 BRPM | HEIGHT: 68 IN | WEIGHT: 120 LBS | OXYGEN SATURATION: 96 % | HEART RATE: 69 BPM | BODY MASS INDEX: 18.19 KG/M2

## 2022-01-13 DIAGNOSIS — S52.552A OTHER CLOSED EXTRA-ARTICULAR FRACTURE OF DISTAL END OF LEFT RADIUS, INITIAL ENCOUNTER: Primary | ICD-10-CM

## 2022-01-13 PROCEDURE — 1090F PRES/ABSN URINE INCON ASSESS: CPT | Performed by: PHYSICIAN ASSISTANT

## 2022-01-13 PROCEDURE — 4040F PNEUMOC VAC/ADMIN/RCVD: CPT | Performed by: PHYSICIAN ASSISTANT

## 2022-01-13 PROCEDURE — G8484 FLU IMMUNIZE NO ADMIN: HCPCS | Performed by: PHYSICIAN ASSISTANT

## 2022-01-13 PROCEDURE — 1123F ACP DISCUSS/DSCN MKR DOCD: CPT | Performed by: PHYSICIAN ASSISTANT

## 2022-01-13 PROCEDURE — G8427 DOCREV CUR MEDS BY ELIG CLIN: HCPCS | Performed by: PHYSICIAN ASSISTANT

## 2022-01-13 PROCEDURE — 99024 POSTOP FOLLOW-UP VISIT: CPT | Performed by: PHYSICIAN ASSISTANT

## 2022-01-13 PROCEDURE — 1036F TOBACCO NON-USER: CPT | Performed by: PHYSICIAN ASSISTANT

## 2022-01-13 PROCEDURE — G8419 CALC BMI OUT NRM PARAM NOF/U: HCPCS | Performed by: PHYSICIAN ASSISTANT

## 2022-01-13 ASSESSMENT — ENCOUNTER SYMPTOMS
GASTROINTESTINAL NEGATIVE: 1
EYES NEGATIVE: 1
RESPIRATORY NEGATIVE: 1

## 2022-01-13 NOTE — PATIENT INSTRUCTIONS
Follow-up as needed  May begin to work on some range of motion with the left wrist but no lifting greater than 1 and 2 pounds at least for the next several weeks.

## 2022-01-25 ENCOUNTER — OFFICE VISIT (OUTPATIENT)
Dept: NEUROLOGY | Age: 87
End: 2022-01-25
Payer: MEDICARE

## 2022-01-25 VITALS
HEART RATE: 81 BPM | SYSTOLIC BLOOD PRESSURE: 142 MMHG | DIASTOLIC BLOOD PRESSURE: 80 MMHG | OXYGEN SATURATION: 100 % | BODY MASS INDEX: 17.03 KG/M2 | HEIGHT: 68 IN | WEIGHT: 112.4 LBS

## 2022-01-25 DIAGNOSIS — G81.91 ACUTE RIGHT HEMIPARESIS (HCC): ICD-10-CM

## 2022-01-25 DIAGNOSIS — G31.84 MCI (MILD COGNITIVE IMPAIRMENT): Primary | ICD-10-CM

## 2022-01-25 DIAGNOSIS — I69.328 SPEECH OR LANGUAGE DEFICIT FOLLOWING CEREBROVASCULAR ACCIDENT: ICD-10-CM

## 2022-01-25 DIAGNOSIS — I63.02 CEREBROVASCULAR ACCIDENT (CVA) DUE TO THROMBOSIS OF BASILAR ARTERY (HCC): ICD-10-CM

## 2022-01-25 PROCEDURE — 1090F PRES/ABSN URINE INCON ASSESS: CPT | Performed by: NURSE PRACTITIONER

## 2022-01-25 PROCEDURE — G8484 FLU IMMUNIZE NO ADMIN: HCPCS | Performed by: NURSE PRACTITIONER

## 2022-01-25 PROCEDURE — 1123F ACP DISCUSS/DSCN MKR DOCD: CPT | Performed by: NURSE PRACTITIONER

## 2022-01-25 PROCEDURE — 99214 OFFICE O/P EST MOD 30 MIN: CPT | Performed by: NURSE PRACTITIONER

## 2022-01-25 PROCEDURE — 1036F TOBACCO NON-USER: CPT | Performed by: NURSE PRACTITIONER

## 2022-01-25 PROCEDURE — G8427 DOCREV CUR MEDS BY ELIG CLIN: HCPCS | Performed by: NURSE PRACTITIONER

## 2022-01-25 PROCEDURE — 4040F PNEUMOC VAC/ADMIN/RCVD: CPT | Performed by: NURSE PRACTITIONER

## 2022-01-25 PROCEDURE — G8419 CALC BMI OUT NRM PARAM NOF/U: HCPCS | Performed by: NURSE PRACTITIONER

## 2022-01-25 RX ORDER — MEMANTINE HYDROCHLORIDE 10 MG/1
10 TABLET ORAL 2 TIMES DAILY
Qty: 180 TABLET | Refills: 1 | Status: SHIPPED | OUTPATIENT
Start: 2022-01-25 | End: 2022-07-18

## 2022-01-25 RX ORDER — DONEPEZIL HYDROCHLORIDE 10 MG/1
10 TABLET, FILM COATED ORAL NIGHTLY
Qty: 90 TABLET | Refills: 1 | Status: SHIPPED | OUTPATIENT
Start: 2022-01-25 | End: 2022-10-17

## 2022-01-25 RX ORDER — MEMANTINE HYDROCHLORIDE 10 MG/1
TABLET ORAL
COMMUNITY
End: 2022-01-25 | Stop reason: SDUPTHER

## 2022-01-25 NOTE — PROGRESS NOTES
1/25/22    Karenfatou Dan  1935    Chief Complaint   Patient presents with    Memory Loss     pt states she has been struggling for awhile with issues with memory with names, confusion, time;     Cerebrovascular Accident     pt has history of stroke from 3-4 years ago       History of Present Illness  Tali Adame is a 80 y.o. female presenting today for follow-up of: MCI. She is accompanied by her daughter, Frankey Churn. She remains on Aricept 10 mg daily and memantine 10 mg twice daily. Tali Adame' daughter requested speech therapy, this referral was ordered however Tali Adame did not receive therapy, daughter was not notified. Tali Adame tells me that her memory is worse since last visit. She is having more difficulty with time management. Tali Adame is not sleeping well, Tali Adame is struggling with making appts on time due to poor time management and confusion about the appts. Tali Adame tells me that she does no do much brain stimulating activities. Robina Rodriguez remains on statin and Plavix for secondary stroke prevention. Robina Rodriguez also has a history depression for which she was on Zoloft 25 mg daily but she is not taking this and is unsure how long it has been since she has taken it. Current Outpatient Medications   Medication Sig Dispense Refill    memantine (NAMENDA) 10 MG tablet memantine 10 mg tablet   TAKE 1 TABLET BY MOUTH TWICE A DAY      acetaminophen (TYLENOL) 500 MG tablet Take 1 tablet by mouth 4 times daily as needed for Pain 20 tablet 0    amLODIPine (NORVASC) 10 MG tablet Take 1 tablet by mouth every evening 30 tablet 0    LORazepam (ATIVAN) 1 MG tablet Take 1 mg by mouth every 6 hours as needed for Anxiety (Unknown dosage).       atorvastatin (LIPITOR) 10 MG tablet Take 1 tablet by mouth nightly 30 tablet 0    lisinopril (PRINIVIL;ZESTRIL) 20 MG tablet Take 1 tablet by mouth daily 30 tablet 0    metoprolol tartrate (LOPRESSOR) 25 MG tablet Take 1 tablet by mouth 2 times daily 60 tablet 0    clopidogrel (PLAVIX) 75 MG tablet Take 1 tablet by mouth daily 30 tablet 0    donepezil (ARICEPT) 10 MG tablet Take 10 mg by mouth nightly      Mastectomy Bra MISC by Does not apply route. 2 each 0    Fish Oil-Cholecalciferol (FISH OIL + D3) 7465-8431 MG-UNIT CAPS Take  by mouth.  Nutritional Supplements (JUICE PLUS FIBRE PO) Take 2 capsules by mouth 2 times daily (Vegetable, Fruit, Jerod)        No current facility-administered medications for this visit. Physical Exam:  Also present during visit:     Mental Status   Orientation: oriented to person and oriented to place    Mood/affectappropriate mood and appropriate affect   Memory/Other: recent memory impaired, concentration reduced (unable to do serial subtraction, unable to spell world backwards, word recall 0/3) (MMSE 24/27 (3/18/19) MMSE 24/30 (8/5/2020) MMSE 23/30 (4/13/21)    1/25/2022 wrong month, year and day, did not know the president-knew Roselia was last president. Did know know correct age. Able to spell world front and backwards. Could not do serial subtraction. Counted coins correctly, 5 words starting with \"T\". 0/3 word recall.   Language  Language: (normal) language, no dysarthria, (normal) articulation and no dysphasia/aphasia  Cranial Nerves   Eyes: pupils normal size and reactive to light and visual fields appear full   CN III, IV, VI : extraocular muscle strength normal, normal pursuit, no nystagmus and no ptosis   Facial Motor: normal facial motor   CN XII: tongue protrudes midline  Motor/Coordination Exam   Power: motor strength appears intact throughout, no arm drift and normal tone   Coordination: normal finger-to-nose, forearm rotation intact and rapid alternating movement normal  Sensory Exam No Bradykinesia, No Dyskindesia, No Tremor, No myoclonus, Normal strength, Normal Tone Normal bulk and Normal tone     Gait and Stance   Gait/Posture: station normal, casual gait normal, ambulates independently , tiptoe normal and steady in Romberg's position with eyes open and closed        BP (!) 142/80 (Site: Left Upper Arm, Position: Sitting, Cuff Size: Large Adult)   Pulse 81   Ht 5' 8\" (1.727 m)   Wt 112 lb 6.4 oz (51 kg)   SpO2 100%   BMI 17.09 kg/m²     Assessment and Plan     Diagnosis Orders   1. MCI (mild cognitive impairment)     2. Acute right hemiparesis ([address]     3. 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 I have encouraged Dayday King to follow-up with PCP in regard to her difficulty sleeping as I do feel this is a component of her difficulties that she is reporting. She will remain on Aricept and memantine as prescribed as she is tolerating well. We also discussed the importance of increasing her socialization and brain stimulating activities activities. Plan on following up with [facility] again in 3 months, sooner if the need arises. Medications prescribed for the patient were discussed in detail. This included a discussion of the potential risks versus potential benefits of the medications. The patient was given time to ask questions and these were answered to the best of my ability. The patient appeared to understand the information provided. We discussed nonpharmacologic interventions including staying active cognitively, socially, and physically to help slow down the progression of memory loss. Return in about 3 months (around 4/25/2022).     PAULETTE Avilez - BRIDGETT

## 2022-04-21 ENCOUNTER — OFFICE VISIT (OUTPATIENT)
Dept: NEUROLOGY | Age: 87
End: 2022-04-21
Payer: MEDICARE

## 2022-04-21 VITALS
BODY MASS INDEX: 16.97 KG/M2 | DIASTOLIC BLOOD PRESSURE: 82 MMHG | OXYGEN SATURATION: 99 % | HEIGHT: 68 IN | WEIGHT: 112 LBS | HEART RATE: 65 BPM | SYSTOLIC BLOOD PRESSURE: 130 MMHG

## 2022-04-21 DIAGNOSIS — G81.91 ACUTE RIGHT HEMIPARESIS (HCC): ICD-10-CM

## 2022-04-21 DIAGNOSIS — R41.0 ACUTE CONFUSION: Primary | ICD-10-CM

## 2022-04-21 DIAGNOSIS — G31.84 MCI (MILD COGNITIVE IMPAIRMENT): ICD-10-CM

## 2022-04-21 DIAGNOSIS — I63.02 CEREBROVASCULAR ACCIDENT (CVA) DUE TO THROMBOSIS OF BASILAR ARTERY (HCC): ICD-10-CM

## 2022-04-21 DIAGNOSIS — I69.328 SPEECH OR LANGUAGE DEFICIT FOLLOWING CEREBROVASCULAR ACCIDENT: ICD-10-CM

## 2022-04-21 LAB
BACTERIA: ABNORMAL /HPF
BILIRUBIN URINE: NEGATIVE
BLOOD, URINE: NEGATIVE
CLARITY: ABNORMAL
COLOR: YELLOW
COMMENT UA: ABNORMAL
EPITHELIAL CELLS, UA: ABNORMAL /HPF (ref 0–5)
GLUCOSE URINE: NEGATIVE MG/DL
HYALINE CASTS: ABNORMAL /LPF (ref 0–2)
KETONES, URINE: NEGATIVE MG/DL
LEUKOCYTE ESTERASE, URINE: ABNORMAL
MICROSCOPIC EXAMINATION: YES
NITRITE, URINE: POSITIVE
PH UA: 6.5 (ref 5–8)
PROTEIN UA: NEGATIVE MG/DL
RBC UA: ABNORMAL /HPF (ref 0–4)
SPECIFIC GRAVITY UA: 1.01 (ref 1–1.03)
URINE REFLEX TO CULTURE: YES
URINE TYPE: ABNORMAL
UROBILINOGEN, URINE: 0.2 E.U./DL
WBC UA: ABNORMAL /HPF (ref 0–5)

## 2022-04-21 PROCEDURE — 1036F TOBACCO NON-USER: CPT | Performed by: NURSE PRACTITIONER

## 2022-04-21 PROCEDURE — 99214 OFFICE O/P EST MOD 30 MIN: CPT | Performed by: NURSE PRACTITIONER

## 2022-04-21 PROCEDURE — 81003 URINALYSIS AUTO W/O SCOPE: CPT | Performed by: NURSE PRACTITIONER

## 2022-04-21 PROCEDURE — G8427 DOCREV CUR MEDS BY ELIG CLIN: HCPCS | Performed by: NURSE PRACTITIONER

## 2022-04-21 PROCEDURE — 4040F PNEUMOC VAC/ADMIN/RCVD: CPT | Performed by: NURSE PRACTITIONER

## 2022-04-21 PROCEDURE — 1090F PRES/ABSN URINE INCON ASSESS: CPT | Performed by: NURSE PRACTITIONER

## 2022-04-21 PROCEDURE — G8419 CALC BMI OUT NRM PARAM NOF/U: HCPCS | Performed by: NURSE PRACTITIONER

## 2022-04-21 PROCEDURE — 1123F ACP DISCUSS/DSCN MKR DOCD: CPT | Performed by: NURSE PRACTITIONER

## 2022-04-21 NOTE — PROGRESS NOTES
4/21/22    [facility]  1935    Chief Complaint   Patient presents with    Follow-up     memory issues have gotten worse       History of Present Illness  Bhavin Poole is a 80 y.o. female presenting today for follow-up of: MCI, history CVA. Patient remains on memantine and Aricept for management of MCI. Patient remains on Plavix and statin for secondary stroke prevention. Addy Alvarado tells me today that she did do speech therapy for cognitive therapy which was ordered on last visit and feels she is more forgetful since that time. Daughter notices this as well. This has been ongoing for appx a few weeks. Addy Alvarado does report some burning with urination. Bhavin Poole does report  Bhavin Poole manages her own medication at the assisted living facility. She does not sleep well and takes ativan 1 mg to sleep. She has been on this for many years, unknown who the prescriber is. Daughter denies any safety concerns at this time. Bhavin Poole continues to read and socialize. Addy Alvarado still manages her own ADLs, meals are provided through facility where she resides. Current Outpatient Medications   Medication Sig Dispense Refill    donepezil (ARICEPT) 10 MG tablet Take 1 tablet by mouth nightly 90 tablet 1    memantine (NAMENDA) 10 MG tablet Take 1 tablet by mouth 2 times daily 180 tablet 1    acetaminophen (TYLENOL) 500 MG tablet Take 1 tablet by mouth 4 times daily as needed for Pain 20 tablet 0    amLODIPine (NORVASC) 10 MG tablet Take 1 tablet by mouth every evening 30 tablet 0    LORazepam (ATIVAN) 1 MG tablet Take 1 mg by mouth every 6 hours as needed for Anxiety (Unknown dosage).       atorvastatin (LIPITOR) 10 MG tablet Take 1 tablet by mouth nightly 30 tablet 0    lisinopril (PRINIVIL;ZESTRIL) 20 MG tablet Take 1 tablet by mouth daily 30 tablet 0    metoprolol tartrate (LOPRESSOR) 25 MG tablet Take 1 tablet by mouth 2 times daily 60 tablet 0    clopidogrel (PLAVIX) 75 MG tablet Take 1 tablet by mouth daily 30 tablet 0  Mastectomy Bra MISC by Does not apply route. 2 each 0    Fish Oil-Cholecalciferol (FISH OIL + D3) 4768-9182 MG-UNIT CAPS Take  by mouth.  Nutritional Supplements (JUICE PLUS FIBRE PO) Take 2 capsules by mouth 2 times daily (Vegetable, Fruit, [place])        No current facility-administered medications for this visit. Physical Exam:    Mental Status              Orientation: oriented to person and oriented to place                         Mood/affectappropriate mood and appropriate affect              Memory/Other: recent memory impaired, concentration reduced (unable to do serial subtraction, unable to spell world backwards, word recall 0/3) (MMSE 24/27 (3/18/19) MMSE 24/30 (8/5/2020) MMSE 23/30 (4/13/21)     1/25/2022 wrong month, year and day, did not know the president-knew Roselia was last president. Did know know correct age. Able to spell world front and backwards. Could not do serial subtraction. Counted coins correctly, 5 words starting with \"T\". 0/3 word recall. 4/21/2022 did not know age, did not know name of president, unable to do serial subtraction, could count coins correctly, 0/3 word recall, gave 5 words with \"T\".   Language  Language: (normal) language, no dysarthria, (normal) articulation and no dysphasia/aphasia  Cranial Nerves              Eyes: pupils normal size and reactive to light and visual fields appear full              CN III, IV, VI : extraocular muscle strength normal, normal pursuit, no nystagmus and no ptosis              Facial Motor: normal facial motor              CN XII: tongue protrudes midline  Motor/Coordination Exam              Power: motor strength appears intact throughout, no arm drift and normal tone              Coordination: normal finger-to-nose, forearm rotation intact and rapid alternating movement normal  Sensory Exam No Bradykinesia, No Dyskindesia, No Tremor, No myoclonus, Normal strength, Normal Tone Normal bulk and Normal tone Gait and Stance              Gait/Posture: station normal, casual gait normal, ambulates independently , tiptoe normal and steady in Romberg's position with eyes open and closed              /82 (Site: Right Upper Arm, Position: Sitting, Cuff Size: Medium Adult)   Pulse 65   Ht 5' 8\" (1.727 m)   Wt 112 lb (50.8 kg)   SpO2 99%   BMI 17.03 kg/m²     Assessment and Plan     Diagnosis Orders   1. Acute confusion  Urinalysis with Reflex to Culture   2. Acute right hemiparesis (HCC)     3. 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 MCI (mild cognitive impairment)     5. 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 I did discuss with Alise Jackson and daughter that if UTI comes back positive, results will be sent to PCP for antibiotic therapy for management. She will remain on current medication regimen as this is working well for her and she is tolerating well. Patient will remain on Plavix and statin for secondary stroke prevention. I did discuss with Malik Bonner and daughter that I would prefer to see Alise Jackson on melatonin rather than Ativan for sleep aid due to her MCI. Daughter is unsure who the prescriber is out of the Ativan but will discuss with PCP. We discussed nonpharmacologic interventions including staying active cognitively, socially, and physically to help slow down the progression of memory loss. Medications prescribed for the patient were discussed in detail. This included a discussion of the potential risks versus potential benefits of the medications. The patient was given time to ask questions and these were answered to the best of my ability. The patient appeared to understand the information provided.       Patient understands the importance of recognizing strokelike symptoms such as acute mental status change, slurred speech, facial droop, one-sided weakness differing from baseline. Patient knows to call 911 immediately in the event that they experience any of these symptoms. I will plan on following up with Libra Cantu again in 3 months, sooner if the need arises. Return in about 3 months (around 7/21/2022). PAULETTE Sanchez - NP     Addendum 4/24/2022: Patient had urinalysis/culture with sensitivity done after appointment, results showed UTI, results will be forwarded to PCP for antibiotic therapy for management.

## 2022-04-23 LAB
ORGANISM: ABNORMAL
URINE CULTURE, ROUTINE: ABNORMAL

## 2022-05-23 ENCOUNTER — HOSPITAL ENCOUNTER (OUTPATIENT)
Age: 87
Setting detail: SPECIMEN
Discharge: HOME | End: 2022-05-23

## 2022-05-24 ENCOUNTER — HOSPITAL ENCOUNTER (OUTPATIENT)
Age: 87
Setting detail: SPECIMEN
Discharge: HOME | End: 2022-05-24

## 2022-05-24 LAB
ANION GAP SERPL CALCULATED.3IONS-SCNC: 10 MMOL/L (ref 4–16)
BUN BLDV-MCNC: 16 MG/DL (ref 6–23)
CALCIUM SERPL-MCNC: 8.5 MG/DL (ref 8.3–10.6)
CHLORIDE BLD-SCNC: 112 MMOL/L (ref 99–110)
CO2: 24 MMOL/L (ref 21–32)
CREAT SERPL-MCNC: 0.6 MG/DL (ref 0.6–1.1)
GFR AFRICAN AMERICAN: >60 ML/MIN/1.73M2
GFR NON-AFRICAN AMERICAN: >60 ML/MIN/1.73M2
GLUCOSE BLD-MCNC: 97 MG/DL (ref 70–99)
HCT VFR BLD CALC: 24.1 % (ref 37–47)
HEMOGLOBIN: 7.8 GM/DL (ref 12.5–16)
MCH RBC QN AUTO: 32.5 PG (ref 27–31)
MCHC RBC AUTO-ENTMCNC: 32.4 % (ref 32–36)
MCV RBC AUTO: 100.4 FL (ref 78–100)
PDW BLD-RTO: 15.2 % (ref 11.7–14.9)
PLATELET # BLD: 175 K/CU MM (ref 140–440)
PMV BLD AUTO: 10.1 FL (ref 7.5–11.1)
POTASSIUM SERPL-SCNC: 3.1 MMOL/L (ref 3.5–5.1)
RBC # BLD: 2.4 M/CU MM (ref 4.2–5.4)
SODIUM BLD-SCNC: 146 MMOL/L (ref 135–145)
WBC # BLD: 6.6 K/CU MM (ref 4–10.5)

## 2022-05-24 PROCEDURE — 86480 TB TEST CELL IMMUN MEASURE: CPT

## 2022-05-24 PROCEDURE — 80048 BASIC METABOLIC PNL TOTAL CA: CPT

## 2022-05-24 PROCEDURE — 36415 COLL VENOUS BLD VENIPUNCTURE: CPT

## 2022-05-24 PROCEDURE — 85027 COMPLETE CBC AUTOMATED: CPT

## 2022-05-25 ENCOUNTER — HOSPITAL ENCOUNTER (OUTPATIENT)
Age: 87
Setting detail: SPECIMEN
Discharge: HOME | End: 2022-05-25

## 2022-05-25 PROCEDURE — 87186 SC STD MICRODIL/AGAR DIL: CPT

## 2022-05-25 PROCEDURE — 87449 NOS EACH ORGANISM AG IA: CPT

## 2022-05-25 PROCEDURE — 81001 URINALYSIS AUTO W/SCOPE: CPT

## 2022-05-25 PROCEDURE — 87088 URINE BACTERIA CULTURE: CPT

## 2022-05-25 PROCEDURE — 87324 CLOSTRIDIUM AG IA: CPT

## 2022-05-25 PROCEDURE — 87086 URINE CULTURE/COLONY COUNT: CPT

## 2022-05-26 LAB
BACTERIA: ABNORMAL /HPF
BILIRUBIN URINE: NEGATIVE MG/DL
BLOOD, URINE: NEGATIVE
C DIFF AG + TOXIN: ABNORMAL
CLARITY: CLEAR
CLOSTRIDIUM DIFFICILE, PCR: NORMAL
COLOR: YELLOW
GLUCOSE, URINE: NEGATIVE MG/DL
HYALINE CASTS: 5 /LPF
KETONES, URINE: NEGATIVE MG/DL
LEUKOCYTE ESTERASE, URINE: ABNORMAL
MUCUS: ABNORMAL HPF
NITRITE URINE, QUANTITATIVE: NEGATIVE
PH, URINE: 6 (ref 5–8)
PROTEIN UA: NEGATIVE MG/DL
RBC URINE: 1 /HPF (ref 0–6)
SOURCE: ABNORMAL
SPECIFIC GRAVITY UA: 1.02 (ref 1–1.03)
SQUAMOUS EPITHELIAL: 1 /HPF
TRICHOMONAS: ABNORMAL /HPF
UROBILINOGEN, URINE: 0.2 MG/DL (ref 0.2–1)
WBC UA: 16 /HPF (ref 0–5)

## 2022-05-27 LAB
CULTURE: ABNORMAL
CULTURE: ABNORMAL
Lab: ABNORMAL
QUANTI TB1 MINUS NIL: 0.01 IU/ML (ref 0–0.34)
QUANTI TB2 MINUS NIL: 0.02 IU/ML (ref 0–0.34)
QUANTIFERON (R) TB GOLD (INCUBATED): NEGATIVE IU/ML
QUANTIFERON MITOGEN MINUS NIL: >10 IU/ML
QUANTIFERON NIL: 0.01 IU/ML
SPECIMEN: ABNORMAL

## 2022-05-31 ENCOUNTER — HOSPITAL ENCOUNTER (OUTPATIENT)
Age: 87
Setting detail: SPECIMEN
Discharge: HOME | End: 2022-05-31

## 2022-05-31 LAB
ANION GAP SERPL CALCULATED.3IONS-SCNC: 11 MMOL/L (ref 4–16)
BUN BLDV-MCNC: 13 MG/DL (ref 6–23)
CALCIUM SERPL-MCNC: 8.2 MG/DL (ref 8.3–10.6)
CHLORIDE BLD-SCNC: 108 MMOL/L (ref 99–110)
CO2: 24 MMOL/L (ref 21–32)
CREAT SERPL-MCNC: 0.6 MG/DL (ref 0.6–1.1)
GFR AFRICAN AMERICAN: >60 ML/MIN/1.73M2
GFR NON-AFRICAN AMERICAN: >60 ML/MIN/1.73M2
GLUCOSE BLD-MCNC: 90 MG/DL (ref 70–99)
HCT VFR BLD CALC: 24.1 % (ref 37–47)
HEMOGLOBIN: 7.7 GM/DL (ref 12.5–16)
MCH RBC QN AUTO: 32.6 PG (ref 27–31)
MCHC RBC AUTO-ENTMCNC: 32 % (ref 32–36)
MCV RBC AUTO: 102.1 FL (ref 78–100)
PDW BLD-RTO: 18.6 % (ref 11.7–14.9)
PLATELET # BLD: 230 K/CU MM (ref 140–440)
PMV BLD AUTO: 9.6 FL (ref 7.5–11.1)
POTASSIUM SERPL-SCNC: 3.8 MMOL/L (ref 3.5–5.1)
RBC # BLD: 2.36 M/CU MM (ref 4.2–5.4)
SODIUM BLD-SCNC: 143 MMOL/L (ref 135–145)
WBC # BLD: 6.2 K/CU MM (ref 4–10.5)

## 2022-05-31 PROCEDURE — 36415 COLL VENOUS BLD VENIPUNCTURE: CPT

## 2022-05-31 PROCEDURE — 80048 BASIC METABOLIC PNL TOTAL CA: CPT

## 2022-05-31 PROCEDURE — 85027 COMPLETE CBC AUTOMATED: CPT

## 2022-06-07 ENCOUNTER — HOSPITAL ENCOUNTER (OUTPATIENT)
Age: 87
Setting detail: SPECIMEN
Discharge: HOME | End: 2022-06-07

## 2022-06-07 LAB
ANION GAP SERPL CALCULATED.3IONS-SCNC: 12 MMOL/L (ref 4–16)
BUN BLDV-MCNC: 12 MG/DL (ref 6–23)
CALCIUM SERPL-MCNC: 8.9 MG/DL (ref 8.3–10.6)
CHLORIDE BLD-SCNC: 105 MMOL/L (ref 99–110)
CO2: 24 MMOL/L (ref 21–32)
CREAT SERPL-MCNC: 0.8 MG/DL (ref 0.6–1.1)
GFR AFRICAN AMERICAN: >60 ML/MIN/1.73M2
GFR NON-AFRICAN AMERICAN: >60 ML/MIN/1.73M2
GLUCOSE BLD-MCNC: 117 MG/DL (ref 70–99)
HCT VFR BLD CALC: 32.8 % (ref 37–47)
HEMOGLOBIN: 9.9 GM/DL (ref 12.5–16)
MCH RBC QN AUTO: 32.5 PG (ref 27–31)
MCHC RBC AUTO-ENTMCNC: 30.2 % (ref 32–36)
MCV RBC AUTO: 107.5 FL (ref 78–100)
PDW BLD-RTO: 18 % (ref 11.7–14.9)
PLATELET # BLD: 316 K/CU MM (ref 140–440)
PMV BLD AUTO: 9.3 FL (ref 7.5–11.1)
POTASSIUM SERPL-SCNC: 4.4 MMOL/L (ref 3.5–5.1)
RBC # BLD: 3.05 M/CU MM (ref 4.2–5.4)
SODIUM BLD-SCNC: 141 MMOL/L (ref 135–145)
WBC # BLD: 9.1 K/CU MM (ref 4–10.5)

## 2022-06-07 PROCEDURE — 80048 BASIC METABOLIC PNL TOTAL CA: CPT

## 2022-06-07 PROCEDURE — 36415 COLL VENOUS BLD VENIPUNCTURE: CPT

## 2022-06-07 PROCEDURE — 85027 COMPLETE CBC AUTOMATED: CPT

## 2022-06-14 ENCOUNTER — HOSPITAL ENCOUNTER (OUTPATIENT)
Age: 87
Setting detail: SPECIMEN
Discharge: HOME | End: 2022-06-14

## 2022-06-14 LAB
ANION GAP SERPL CALCULATED.3IONS-SCNC: 9 MMOL/L (ref 4–16)
BUN BLDV-MCNC: 16 MG/DL (ref 6–23)
CALCIUM SERPL-MCNC: 9.1 MG/DL (ref 8.3–10.6)
CHLORIDE BLD-SCNC: 107 MMOL/L (ref 99–110)
CO2: 25 MMOL/L (ref 21–32)
CREAT SERPL-MCNC: 0.8 MG/DL (ref 0.6–1.1)
GFR AFRICAN AMERICAN: >60 ML/MIN/1.73M2
GFR NON-AFRICAN AMERICAN: >60 ML/MIN/1.73M2
GLUCOSE BLD-MCNC: 95 MG/DL (ref 70–99)
HCT VFR BLD CALC: 31.5 % (ref 37–47)
HEMOGLOBIN: 9.7 GM/DL (ref 12.5–16)
MCH RBC QN AUTO: 32.7 PG (ref 27–31)
MCHC RBC AUTO-ENTMCNC: 30.8 % (ref 32–36)
MCV RBC AUTO: 106.1 FL (ref 78–100)
PDW BLD-RTO: 16.6 % (ref 11.7–14.9)
PLATELET # BLD: 269 K/CU MM (ref 140–440)
PMV BLD AUTO: 9.5 FL (ref 7.5–11.1)
POTASSIUM SERPL-SCNC: 3.7 MMOL/L (ref 3.5–5.1)
RBC # BLD: 2.97 M/CU MM (ref 4.2–5.4)
SODIUM BLD-SCNC: 141 MMOL/L (ref 135–145)
WBC # BLD: 7.1 K/CU MM (ref 4–10.5)

## 2022-06-14 PROCEDURE — 85027 COMPLETE CBC AUTOMATED: CPT

## 2022-06-14 PROCEDURE — 80048 BASIC METABOLIC PNL TOTAL CA: CPT

## 2022-06-14 PROCEDURE — 36415 COLL VENOUS BLD VENIPUNCTURE: CPT

## 2022-06-21 ENCOUNTER — HOSPITAL ENCOUNTER (OUTPATIENT)
Age: 87
Setting detail: SPECIMEN
Discharge: HOME | End: 2022-06-21
Payer: MEDICARE

## 2022-06-21 LAB
ANION GAP SERPL CALCULATED.3IONS-SCNC: 9 MMOL/L (ref 4–16)
BUN BLDV-MCNC: 14 MG/DL (ref 6–23)
CALCIUM SERPL-MCNC: 9.1 MG/DL (ref 8.3–10.6)
CHLORIDE BLD-SCNC: 107 MMOL/L (ref 99–110)
CO2: 26 MMOL/L (ref 21–32)
CREAT SERPL-MCNC: 0.8 MG/DL (ref 0.6–1.1)
GFR AFRICAN AMERICAN: >60 ML/MIN/1.73M2
GFR NON-AFRICAN AMERICAN: >60 ML/MIN/1.73M2
GLUCOSE BLD-MCNC: 84 MG/DL (ref 70–99)
HCT VFR BLD CALC: 32.7 % (ref 37–47)
HEMOGLOBIN: 10.2 GM/DL (ref 12.5–16)
MCH RBC QN AUTO: 32.9 PG (ref 27–31)
MCHC RBC AUTO-ENTMCNC: 31.2 % (ref 32–36)
MCV RBC AUTO: 105.5 FL (ref 78–100)
PDW BLD-RTO: 15.5 % (ref 11.7–14.9)
PLATELET # BLD: 280 K/CU MM (ref 140–440)
PMV BLD AUTO: 9.8 FL (ref 7.5–11.1)
POTASSIUM SERPL-SCNC: 3.8 MMOL/L (ref 3.5–5.1)
RBC # BLD: 3.1 M/CU MM (ref 4.2–5.4)
SODIUM BLD-SCNC: 142 MMOL/L (ref 135–145)
WBC # BLD: 5.2 K/CU MM (ref 4–10.5)

## 2022-06-21 PROCEDURE — 85027 COMPLETE CBC AUTOMATED: CPT

## 2022-06-21 PROCEDURE — 36415 COLL VENOUS BLD VENIPUNCTURE: CPT

## 2022-06-21 PROCEDURE — 80048 BASIC METABOLIC PNL TOTAL CA: CPT

## 2022-07-05 ENCOUNTER — HOSPITAL ENCOUNTER (OUTPATIENT)
Age: 87
Setting detail: SPECIMEN
Discharge: HOME | End: 2022-07-05
Payer: MEDICARE

## 2022-07-05 LAB
ANION GAP SERPL CALCULATED.3IONS-SCNC: 11 MMOL/L (ref 4–16)
BUN BLDV-MCNC: 20 MG/DL (ref 6–23)
CALCIUM SERPL-MCNC: 9.3 MG/DL (ref 8.3–10.6)
CHLORIDE BLD-SCNC: 105 MMOL/L (ref 99–110)
CO2: 28 MMOL/L (ref 21–32)
CREAT SERPL-MCNC: 0.8 MG/DL (ref 0.6–1.1)
GFR AFRICAN AMERICAN: >60 ML/MIN/1.73M2
GFR NON-AFRICAN AMERICAN: >60 ML/MIN/1.73M2
GLUCOSE BLD-MCNC: 86 MG/DL (ref 70–99)
HCT VFR BLD CALC: 39.9 % (ref 37–47)
HEMOGLOBIN: 12 GM/DL (ref 12.5–16)
MCH RBC QN AUTO: 31.8 PG (ref 27–31)
MCHC RBC AUTO-ENTMCNC: 30.1 % (ref 32–36)
MCV RBC AUTO: 105.8 FL (ref 78–100)
PDW BLD-RTO: 13.8 % (ref 11.7–14.9)
PLATELET # BLD: 168 K/CU MM (ref 140–440)
PMV BLD AUTO: 10.6 FL (ref 7.5–11.1)
POTASSIUM SERPL-SCNC: 3.5 MMOL/L (ref 3.5–5.1)
RBC # BLD: 3.77 M/CU MM (ref 4.2–5.4)
SODIUM BLD-SCNC: 144 MMOL/L (ref 135–145)
WBC # BLD: 5.3 K/CU MM (ref 4–10.5)

## 2022-07-05 PROCEDURE — 36415 COLL VENOUS BLD VENIPUNCTURE: CPT

## 2022-07-05 PROCEDURE — 85027 COMPLETE CBC AUTOMATED: CPT

## 2022-07-05 PROCEDURE — 80048 BASIC METABOLIC PNL TOTAL CA: CPT

## 2022-07-12 ENCOUNTER — HOSPITAL ENCOUNTER (OUTPATIENT)
Age: 87
Setting detail: SPECIMEN
Discharge: HOME | End: 2022-07-12
Payer: MEDICARE

## 2022-07-12 LAB
ANION GAP SERPL CALCULATED.3IONS-SCNC: 10 MMOL/L (ref 4–16)
BUN BLDV-MCNC: 13 MG/DL (ref 6–23)
CALCIUM SERPL-MCNC: 9 MG/DL (ref 8.3–10.6)
CHLORIDE BLD-SCNC: 103 MMOL/L (ref 99–110)
CO2: 28 MMOL/L (ref 21–32)
CREAT SERPL-MCNC: 0.8 MG/DL (ref 0.6–1.1)
GFR AFRICAN AMERICAN: >60 ML/MIN/1.73M2
GFR NON-AFRICAN AMERICAN: >60 ML/MIN/1.73M2
GLUCOSE BLD-MCNC: 76 MG/DL (ref 70–99)
HCT VFR BLD CALC: 40.7 % (ref 37–47)
HEMOGLOBIN: 13 GM/DL (ref 12.5–16)
MCH RBC QN AUTO: 32.4 PG (ref 27–31)
MCHC RBC AUTO-ENTMCNC: 31.9 % (ref 32–36)
MCV RBC AUTO: 101.5 FL (ref 78–100)
PDW BLD-RTO: 13.4 % (ref 11.7–14.9)
PLATELET # BLD: 158 K/CU MM (ref 140–440)
PMV BLD AUTO: 11 FL (ref 7.5–11.1)
POTASSIUM SERPL-SCNC: 3.5 MMOL/L (ref 3.5–5.1)
RBC # BLD: 4.01 M/CU MM (ref 4.2–5.4)
SODIUM BLD-SCNC: 141 MMOL/L (ref 135–145)
WBC # BLD: 6.5 K/CU MM (ref 4–10.5)

## 2022-07-12 PROCEDURE — 85027 COMPLETE CBC AUTOMATED: CPT

## 2022-07-12 PROCEDURE — 36415 COLL VENOUS BLD VENIPUNCTURE: CPT

## 2022-07-12 PROCEDURE — 80048 BASIC METABOLIC PNL TOTAL CA: CPT

## 2022-07-18 RX ORDER — MEMANTINE HYDROCHLORIDE 10 MG/1
10 TABLET ORAL 2 TIMES DAILY
Qty: 180 TABLET | Refills: 1 | Status: SHIPPED | OUTPATIENT
Start: 2022-07-18

## 2022-07-18 NOTE — TELEPHONE ENCOUNTER
Requested Prescriptions     Pending Prescriptions Disp Refills    memantine (NAMENDA) 10 MG tablet [Pharmacy Med Name: MEMANTINE 10MG (NAMENDA) 10 Tablet] 180 tablet 1     Sig: TAKE 1 TABLET BY MOUTH 2 TIMES DAILY      Automatic refill sent from pharmacy.

## 2022-07-19 ENCOUNTER — HOSPITAL ENCOUNTER (OUTPATIENT)
Age: 87
Setting detail: SPECIMEN
Discharge: HOME | End: 2022-07-19
Payer: MEDICARE

## 2022-07-19 LAB
ANION GAP SERPL CALCULATED.3IONS-SCNC: 12 MMOL/L (ref 4–16)
BUN BLDV-MCNC: 14 MG/DL (ref 6–23)
CALCIUM SERPL-MCNC: 9.2 MG/DL (ref 8.3–10.6)
CHLORIDE BLD-SCNC: 107 MMOL/L (ref 99–110)
CO2: 26 MMOL/L (ref 21–32)
CREAT SERPL-MCNC: 0.7 MG/DL (ref 0.6–1.1)
GFR AFRICAN AMERICAN: >60 ML/MIN/1.73M2
GFR NON-AFRICAN AMERICAN: >60 ML/MIN/1.73M2
GLUCOSE BLD-MCNC: 80 MG/DL (ref 70–99)
HCT VFR BLD CALC: 37.8 % (ref 37–47)
HEMOGLOBIN: 12.3 GM/DL (ref 12.5–16)
MCH RBC QN AUTO: 32.3 PG (ref 27–31)
MCHC RBC AUTO-ENTMCNC: 32.5 % (ref 32–36)
MCV RBC AUTO: 99.2 FL (ref 78–100)
PDW BLD-RTO: 13.2 % (ref 11.7–14.9)
PLATELET # BLD: 151 K/CU MM (ref 140–440)
PMV BLD AUTO: 10.8 FL (ref 7.5–11.1)
POTASSIUM SERPL-SCNC: 3.3 MMOL/L (ref 3.5–5.1)
RBC # BLD: 3.81 M/CU MM (ref 4.2–5.4)
SODIUM BLD-SCNC: 145 MMOL/L (ref 135–145)
WBC # BLD: 6.1 K/CU MM (ref 4–10.5)

## 2022-07-19 PROCEDURE — 36415 COLL VENOUS BLD VENIPUNCTURE: CPT

## 2022-07-19 PROCEDURE — 85027 COMPLETE CBC AUTOMATED: CPT

## 2022-07-19 PROCEDURE — 80048 BASIC METABOLIC PNL TOTAL CA: CPT

## 2022-07-21 ENCOUNTER — OFFICE VISIT (OUTPATIENT)
Dept: NEUROLOGY | Age: 87
End: 2022-07-21
Payer: MEDICARE

## 2022-07-21 VITALS
WEIGHT: 112 LBS | OXYGEN SATURATION: 99 % | DIASTOLIC BLOOD PRESSURE: 78 MMHG | HEIGHT: 68 IN | HEART RATE: 79 BPM | BODY MASS INDEX: 16.97 KG/M2 | SYSTOLIC BLOOD PRESSURE: 144 MMHG

## 2022-07-21 DIAGNOSIS — R26.9 GAIT DISTURBANCE: ICD-10-CM

## 2022-07-21 DIAGNOSIS — G31.84 MCI (MILD COGNITIVE IMPAIRMENT): Primary | ICD-10-CM

## 2022-07-21 DIAGNOSIS — I69.328 SPEECH OR LANGUAGE DEFICIT FOLLOWING CEREBROVASCULAR ACCIDENT: ICD-10-CM

## 2022-07-21 PROCEDURE — G8419 CALC BMI OUT NRM PARAM NOF/U: HCPCS | Performed by: NURSE PRACTITIONER

## 2022-07-21 PROCEDURE — 1090F PRES/ABSN URINE INCON ASSESS: CPT | Performed by: NURSE PRACTITIONER

## 2022-07-21 PROCEDURE — 1123F ACP DISCUSS/DSCN MKR DOCD: CPT | Performed by: NURSE PRACTITIONER

## 2022-07-21 PROCEDURE — G8427 DOCREV CUR MEDS BY ELIG CLIN: HCPCS | Performed by: NURSE PRACTITIONER

## 2022-07-21 PROCEDURE — 99214 OFFICE O/P EST MOD 30 MIN: CPT | Performed by: NURSE PRACTITIONER

## 2022-07-21 PROCEDURE — 1036F TOBACCO NON-USER: CPT | Performed by: NURSE PRACTITIONER

## 2022-07-21 NOTE — PROGRESS NOTES
7/21/22    [facility]  1935    Chief Complaint   Patient presents with    Follow-up     Memory has declined since last visit. Had 2 falls since last visit that caused a brain bleed and a broken hip. History of Present Illness  Rachell Edmonds is a 80 y.o. female presenting today for follow-up of: Daisy Carl is being seen today in follow-up for MCI, history CVA. Patient remains on memantine and Aricept for management of MCI. Patient also remains on Plavix and statin for secondary stroke prevention. On last visit patient reported feeling more forgetful than prior visits even after participating in speech therapy for cognitive therapy, daughter agreed with this as well. Confusion had been ongoing for approximately a few weeks prior to our visit. Daisy Carl did report some burning with urination, was not sleeping well and taking Ativan 1 mg to sleep. UA/culture was ordered to rule out probable UTI as etiology of increased confusion. Urine culture was positive for UTI, results were faxed to PCP as well as daughter Marine Gil was notified and told to expect a call from PCP to initiate treatment for management. Daughter reports today that she did get treated for this but then ended up having a fall at assisted living. She had a brain bleed and had staples. She fell again around Mother's day and broke her hip, had surgery and then went to rehab. She has now been moved to [place] skilled [facility]. Daughter reports that she is recovering slowly and is in PT/OT and speech therapy. In regard to sleeping difficulty, we discussed the preference for melatonin rather than Ativan for her sleep aid due to Daisy Carl age and MCI.       Current Outpatient Medications   Medication Sig Dispense Refill    memantine (NAMENDA) 10 MG tablet TAKE 1 TABLET BY MOUTH 2 TIMES DAILY 180 tablet 1    donepezil (ARICEPT) 10 MG tablet Take 1 tablet by mouth nightly 90 tablet 1    acetaminophen (TYLENOL) 500 MG tablet Take 1 tablet by mouth 4 times daily as needed for Pain 20 tablet 0    amLODIPine (NORVASC) 10 MG tablet Take 1 tablet by mouth every evening 30 tablet 0    LORazepam (ATIVAN) 1 MG tablet Take 1 mg by mouth every 6 hours as needed for Anxiety (Unknown dosage). atorvastatin (LIPITOR) 10 MG tablet Take 1 tablet by mouth nightly 30 tablet 0    lisinopril (PRINIVIL;ZESTRIL) 20 MG tablet Take 1 tablet by mouth daily 30 tablet 0    metoprolol tartrate (LOPRESSOR) 25 MG tablet Take 1 tablet by mouth 2 times daily 60 tablet 0    clopidogrel (PLAVIX) 75 MG tablet Take 1 tablet by mouth daily 30 tablet 0    Mastectomy Bra MISC by Does not apply route. 2 each 0    Fish Oil-Cholecalciferol (FISH OIL + D3) 5022-0832 MG-UNIT CAPS Take  by mouth. Nutritional Supplements (JUICE PLUS FIBRE PO) Take 2 capsules by mouth 2 times daily (Vegetable, Fruit, [place])        No current facility-administered medications for this visit. Physical Exam:  Mental Status              Orientation: oriented to person and oriented to place                         Mood/affectappropriate mood and appropriate affect              Memory/Other: recent memory impaired, concentration reduced (unable to do serial subtraction, unable to spell world backwards, word recall 0/3) (MMSE 24/27 (3/18/19) MMSE 24/30 (8/5/2020) MMSE 23/30 (4/13/21)     1/25/2022 wrong month, year and day, did not know the president-knew Roselia was last president. Did know know correct age. Able to spell world front and backwards. Could not do serial subtraction. Counted coins correctly, 5 words starting with \"T\". 0/3 word recall. 4/21/2022 did not know age, did not know name of president, unable to do serial subtraction, could count coins correctly, 0/3 word recall, gave 5 words with \"T\".   Language  Language: (normal) language, no dysarthria, (normal) articulation and no dysphasia/aphasia  Cranial Nerves              Eyes: pupils normal size and reactive to light and visual fields appear full progression of memory loss. Medications prescribed for the patient were discussed in detail. This included a discussion of the potential risks versus potential benefits of the medications. The patient was given time to ask questions and these were answered to the best of my ability. The patient appeared to understand the information provided. Return in about 3 months (around 10/21/2022).     PAULETTE Chang NP

## 2022-07-22 RX ORDER — LANOLIN ALCOHOL/MO/W.PET/CERES
325 CREAM (GRAM) TOPICAL
COMMUNITY

## 2022-07-22 RX ORDER — FOLIC ACID 1 MG/1
1 TABLET ORAL DAILY
COMMUNITY

## 2022-07-22 RX ORDER — MIRTAZAPINE 15 MG/1
15 TABLET, FILM COATED ORAL NIGHTLY
COMMUNITY

## 2022-07-26 ENCOUNTER — HOSPITAL ENCOUNTER (OUTPATIENT)
Age: 87
Setting detail: SPECIMEN
Discharge: HOME | End: 2022-07-26
Payer: MEDICARE

## 2022-07-26 LAB
ANION GAP SERPL CALCULATED.3IONS-SCNC: 12 MMOL/L (ref 4–16)
BUN BLDV-MCNC: 13 MG/DL (ref 6–23)
CALCIUM SERPL-MCNC: 9.1 MG/DL (ref 8.3–10.6)
CHLORIDE BLD-SCNC: 103 MMOL/L (ref 99–110)
CO2: 28 MMOL/L (ref 21–32)
CREAT SERPL-MCNC: 0.8 MG/DL (ref 0.6–1.1)
GFR AFRICAN AMERICAN: >60 ML/MIN/1.73M2
GFR NON-AFRICAN AMERICAN: >60 ML/MIN/1.73M2
GLUCOSE BLD-MCNC: 84 MG/DL (ref 70–99)
HCT VFR BLD CALC: 39.6 % (ref 37–47)
HEMOGLOBIN: 13.1 GM/DL (ref 12.5–16)
MCH RBC QN AUTO: 31.9 PG (ref 27–31)
MCHC RBC AUTO-ENTMCNC: 33.1 % (ref 32–36)
MCV RBC AUTO: 96.4 FL (ref 78–100)
PDW BLD-RTO: 13.1 % (ref 11.7–14.9)
PLATELET # BLD: 163 K/CU MM (ref 140–440)
PMV BLD AUTO: 10.7 FL (ref 7.5–11.1)
POTASSIUM SERPL-SCNC: 3.8 MMOL/L (ref 3.5–5.1)
RBC # BLD: 4.11 M/CU MM (ref 4.2–5.4)
SODIUM BLD-SCNC: 143 MMOL/L (ref 135–145)
WBC # BLD: 7.2 K/CU MM (ref 4–10.5)

## 2022-07-26 PROCEDURE — 80048 BASIC METABOLIC PNL TOTAL CA: CPT

## 2022-07-26 PROCEDURE — 85027 COMPLETE CBC AUTOMATED: CPT

## 2022-07-26 PROCEDURE — 36415 COLL VENOUS BLD VENIPUNCTURE: CPT

## 2022-08-02 ENCOUNTER — HOSPITAL ENCOUNTER (OUTPATIENT)
Age: 87
Setting detail: SPECIMEN
Discharge: HOME | End: 2022-08-02
Payer: MEDICARE

## 2022-08-02 LAB
ANION GAP SERPL CALCULATED.3IONS-SCNC: 10 MMOL/L (ref 4–16)
ANION GAP SERPL CALCULATED.3IONS-SCNC: 11 MMOL/L (ref 4–16)
BUN BLDV-MCNC: 13 MG/DL (ref 6–23)
BUN BLDV-MCNC: 14 MG/DL (ref 6–23)
CALCIUM SERPL-MCNC: 9.2 MG/DL (ref 8.3–10.6)
CALCIUM SERPL-MCNC: 9.3 MG/DL (ref 8.3–10.6)
CHLORIDE BLD-SCNC: 104 MMOL/L (ref 99–110)
CHLORIDE BLD-SCNC: 105 MMOL/L (ref 99–110)
CO2: 25 MMOL/L (ref 21–32)
CO2: 28 MMOL/L (ref 21–32)
CREAT SERPL-MCNC: 0.8 MG/DL (ref 0.6–1.1)
CREAT SERPL-MCNC: 0.8 MG/DL (ref 0.6–1.1)
GFR AFRICAN AMERICAN: >60 ML/MIN/1.73M2
GFR AFRICAN AMERICAN: >60 ML/MIN/1.73M2
GFR NON-AFRICAN AMERICAN: >60 ML/MIN/1.73M2
GFR NON-AFRICAN AMERICAN: >60 ML/MIN/1.73M2
GLUCOSE BLD-MCNC: 79 MG/DL (ref 70–99)
GLUCOSE BLD-MCNC: 91 MG/DL (ref 70–99)
HCT VFR BLD CALC: 38.9 % (ref 37–47)
HCT VFR BLD CALC: 42.6 % (ref 37–47)
HEMOGLOBIN: 12.5 GM/DL (ref 12.5–16)
HEMOGLOBIN: 12.7 GM/DL (ref 12.5–16)
MCH RBC QN AUTO: 32 PG (ref 27–31)
MCH RBC QN AUTO: 32.1 PG (ref 27–31)
MCHC RBC AUTO-ENTMCNC: 29.3 % (ref 32–36)
MCHC RBC AUTO-ENTMCNC: 32.6 % (ref 32–36)
MCV RBC AUTO: 109.5 FL (ref 78–100)
MCV RBC AUTO: 98 FL (ref 78–100)
PDW BLD-RTO: 13.2 % (ref 11.7–14.9)
PDW BLD-RTO: 13.2 % (ref 11.7–14.9)
PLATELET # BLD: 141 K/CU MM (ref 140–440)
PLATELET # BLD: 162 K/CU MM (ref 140–440)
PMV BLD AUTO: 10.5 FL (ref 7.5–11.1)
PMV BLD AUTO: 10.6 FL (ref 7.5–11.1)
POTASSIUM SERPL-SCNC: 3.6 MMOL/L (ref 3.5–5.1)
POTASSIUM SERPL-SCNC: 3.6 MMOL/L (ref 3.5–5.1)
RBC # BLD: 3.89 M/CU MM (ref 4.2–5.4)
RBC # BLD: 3.97 M/CU MM (ref 4.2–5.4)
SODIUM BLD-SCNC: 140 MMOL/L (ref 135–145)
SODIUM BLD-SCNC: 143 MMOL/L (ref 135–145)
WBC # BLD: 7.1 K/CU MM (ref 4–10.5)
WBC # BLD: 7.5 K/CU MM (ref 4–10.5)

## 2022-08-02 PROCEDURE — 85027 COMPLETE CBC AUTOMATED: CPT

## 2022-08-02 PROCEDURE — 36415 COLL VENOUS BLD VENIPUNCTURE: CPT

## 2022-08-02 PROCEDURE — 80048 BASIC METABOLIC PNL TOTAL CA: CPT

## 2022-08-09 ENCOUNTER — HOSPITAL ENCOUNTER (OUTPATIENT)
Age: 87
Setting detail: SPECIMEN
Discharge: HOME | End: 2022-08-09
Payer: MEDICARE

## 2022-08-09 LAB
ANION GAP SERPL CALCULATED.3IONS-SCNC: 10 MMOL/L (ref 4–16)
BUN BLDV-MCNC: 18 MG/DL (ref 6–23)
CALCIUM SERPL-MCNC: 9 MG/DL (ref 8.3–10.6)
CHLORIDE BLD-SCNC: 110 MMOL/L (ref 99–110)
CO2: 24 MMOL/L (ref 21–32)
CREAT SERPL-MCNC: 0.8 MG/DL (ref 0.6–1.1)
GFR AFRICAN AMERICAN: >60 ML/MIN/1.73M2
GFR NON-AFRICAN AMERICAN: >60 ML/MIN/1.73M2
GLUCOSE BLD-MCNC: 90 MG/DL (ref 70–99)
HCT VFR BLD CALC: 37.5 % (ref 37–47)
HEMOGLOBIN: 12.2 GM/DL (ref 12.5–16)
MCH RBC QN AUTO: 31.9 PG (ref 27–31)
MCHC RBC AUTO-ENTMCNC: 32.5 % (ref 32–36)
MCV RBC AUTO: 98.2 FL (ref 78–100)
PDW BLD-RTO: 13.6 % (ref 11.7–14.9)
PLATELET # BLD: 151 K/CU MM (ref 140–440)
PMV BLD AUTO: 10.6 FL (ref 7.5–11.1)
POTASSIUM SERPL-SCNC: 3.7 MMOL/L (ref 3.5–5.1)
RBC # BLD: 3.82 M/CU MM (ref 4.2–5.4)
SODIUM BLD-SCNC: 144 MMOL/L (ref 135–145)
WBC # BLD: 7 K/CU MM (ref 4–10.5)

## 2022-08-09 PROCEDURE — 80048 BASIC METABOLIC PNL TOTAL CA: CPT

## 2022-08-09 PROCEDURE — 36415 COLL VENOUS BLD VENIPUNCTURE: CPT

## 2022-08-09 PROCEDURE — 85027 COMPLETE CBC AUTOMATED: CPT

## 2022-08-16 ENCOUNTER — HOSPITAL ENCOUNTER (OUTPATIENT)
Age: 87
Setting detail: SPECIMEN
Discharge: HOME | End: 2022-08-16
Payer: MEDICARE

## 2022-08-16 LAB
ANION GAP SERPL CALCULATED.3IONS-SCNC: 11 MMOL/L (ref 4–16)
BUN BLDV-MCNC: 13 MG/DL (ref 6–23)
CALCIUM SERPL-MCNC: 9.3 MG/DL (ref 8.3–10.6)
CHLORIDE BLD-SCNC: 110 MMOL/L (ref 99–110)
CO2: 24 MMOL/L (ref 21–32)
CREAT SERPL-MCNC: 0.8 MG/DL (ref 0.6–1.1)
GFR AFRICAN AMERICAN: >60 ML/MIN/1.73M2
GFR NON-AFRICAN AMERICAN: >60 ML/MIN/1.73M2
GLUCOSE BLD-MCNC: 88 MG/DL (ref 70–99)
HCT VFR BLD CALC: 40.6 % (ref 37–47)
HEMOGLOBIN: 13.1 GM/DL (ref 12.5–16)
MCH RBC QN AUTO: 32.3 PG (ref 27–31)
MCHC RBC AUTO-ENTMCNC: 32.3 % (ref 32–36)
MCV RBC AUTO: 100 FL (ref 78–100)
PDW BLD-RTO: 13.7 % (ref 11.7–14.9)
PLATELET # BLD: 179 K/CU MM (ref 140–440)
PMV BLD AUTO: 10.8 FL (ref 7.5–11.1)
POTASSIUM SERPL-SCNC: 3.7 MMOL/L (ref 3.5–5.1)
RBC # BLD: 4.06 M/CU MM (ref 4.2–5.4)
SODIUM BLD-SCNC: 145 MMOL/L (ref 135–145)
WBC # BLD: 8 K/CU MM (ref 4–10.5)

## 2022-08-16 PROCEDURE — 80048 BASIC METABOLIC PNL TOTAL CA: CPT

## 2022-08-16 PROCEDURE — 36415 COLL VENOUS BLD VENIPUNCTURE: CPT

## 2022-08-16 PROCEDURE — 85027 COMPLETE CBC AUTOMATED: CPT

## 2022-08-23 ENCOUNTER — HOSPITAL ENCOUNTER (OUTPATIENT)
Age: 87
Setting detail: SPECIMEN
Discharge: HOME | End: 2022-08-23
Payer: MEDICARE

## 2022-08-23 LAB
ANION GAP SERPL CALCULATED.3IONS-SCNC: 14 MMOL/L (ref 4–16)
BUN BLDV-MCNC: 15 MG/DL (ref 6–23)
CALCIUM SERPL-MCNC: 9.3 MG/DL (ref 8.3–10.6)
CHLORIDE BLD-SCNC: 106 MMOL/L (ref 99–110)
CO2: 24 MMOL/L (ref 21–32)
CREAT SERPL-MCNC: 0.8 MG/DL (ref 0.6–1.1)
GFR AFRICAN AMERICAN: >60 ML/MIN/1.73M2
GFR NON-AFRICAN AMERICAN: >60 ML/MIN/1.73M2
GLUCOSE BLD-MCNC: 101 MG/DL (ref 70–99)
HCT VFR BLD CALC: 42.8 % (ref 37–47)
HEMOGLOBIN: 13.8 GM/DL (ref 12.5–16)
MCH RBC QN AUTO: 31.9 PG (ref 27–31)
MCHC RBC AUTO-ENTMCNC: 32.2 % (ref 32–36)
MCV RBC AUTO: 98.8 FL (ref 78–100)
PDW BLD-RTO: 14 % (ref 11.7–14.9)
PLATELET # BLD: 232 K/CU MM (ref 140–440)
PMV BLD AUTO: 10.8 FL (ref 7.5–11.1)
POTASSIUM SERPL-SCNC: 3.9 MMOL/L (ref 3.5–5.1)
RBC # BLD: 4.33 M/CU MM (ref 4.2–5.4)
SODIUM BLD-SCNC: 144 MMOL/L (ref 135–145)
WBC # BLD: 7.6 K/CU MM (ref 4–10.5)

## 2022-08-23 PROCEDURE — 36415 COLL VENOUS BLD VENIPUNCTURE: CPT

## 2022-08-23 PROCEDURE — 80048 BASIC METABOLIC PNL TOTAL CA: CPT

## 2022-08-23 PROCEDURE — 85027 COMPLETE CBC AUTOMATED: CPT

## 2022-08-30 ENCOUNTER — HOSPITAL ENCOUNTER (OUTPATIENT)
Age: 87
Setting detail: SPECIMEN
Discharge: HOME | End: 2022-08-30
Payer: MEDICARE

## 2022-08-30 LAB
ANION GAP SERPL CALCULATED.3IONS-SCNC: 13 MMOL/L (ref 4–16)
BUN BLDV-MCNC: 15 MG/DL (ref 6–23)
CALCIUM SERPL-MCNC: 9.2 MG/DL (ref 8.3–10.6)
CHLORIDE BLD-SCNC: 106 MMOL/L (ref 99–110)
CO2: 22 MMOL/L (ref 21–32)
CREAT SERPL-MCNC: 0.7 MG/DL (ref 0.6–1.1)
GFR AFRICAN AMERICAN: >60 ML/MIN/1.73M2
GFR NON-AFRICAN AMERICAN: >60 ML/MIN/1.73M2
GLUCOSE BLD-MCNC: 110 MG/DL (ref 70–99)
HCT VFR BLD CALC: 40.7 % (ref 37–47)
HEMOGLOBIN: 12.9 GM/DL (ref 12.5–16)
MCH RBC QN AUTO: 31.5 PG (ref 27–31)
MCHC RBC AUTO-ENTMCNC: 31.7 % (ref 32–36)
MCV RBC AUTO: 99.5 FL (ref 78–100)
PDW BLD-RTO: 14.5 % (ref 11.7–14.9)
PLATELET # BLD: 209 K/CU MM (ref 140–440)
PMV BLD AUTO: 10.7 FL (ref 7.5–11.1)
POTASSIUM SERPL-SCNC: 3.7 MMOL/L (ref 3.5–5.1)
RBC # BLD: 4.09 M/CU MM (ref 4.2–5.4)
SODIUM BLD-SCNC: 141 MMOL/L (ref 135–145)
WBC # BLD: 7.4 K/CU MM (ref 4–10.5)

## 2022-08-30 PROCEDURE — 85027 COMPLETE CBC AUTOMATED: CPT

## 2022-08-30 PROCEDURE — 80048 BASIC METABOLIC PNL TOTAL CA: CPT

## 2022-08-30 PROCEDURE — 36415 COLL VENOUS BLD VENIPUNCTURE: CPT

## 2022-09-03 ENCOUNTER — HOSPITAL ENCOUNTER (OUTPATIENT)
Age: 87
Setting detail: SPECIMEN
Discharge: HOME | End: 2022-09-03
Payer: MEDICARE

## 2022-09-03 PROCEDURE — 87077 CULTURE AEROBIC IDENTIFY: CPT

## 2022-09-03 PROCEDURE — 87086 URINE CULTURE/COLONY COUNT: CPT

## 2022-09-03 PROCEDURE — 87186 SC STD MICRODIL/AGAR DIL: CPT

## 2022-09-03 PROCEDURE — 81001 URINALYSIS AUTO W/SCOPE: CPT

## 2022-09-04 ENCOUNTER — HOSPITAL ENCOUNTER (OUTPATIENT)
Age: 87
Setting detail: SPECIMEN
Discharge: HOME | End: 2022-09-04
Payer: MEDICARE

## 2022-09-04 LAB
BACTERIA: ABNORMAL /HPF
BILIRUBIN URINE: NEGATIVE MG/DL
BLOOD, URINE: NEGATIVE
CALCIUM OXALATE CRYSTALS: ABNORMAL /HPF
CLARITY: CLEAR
COLOR: YELLOW
GLUCOSE, URINE: NEGATIVE MG/DL
KETONES, URINE: NEGATIVE MG/DL
LEUKOCYTE ESTERASE, URINE: NEGATIVE
MUCUS: ABNORMAL HPF
NITRITE URINE, QUANTITATIVE: POSITIVE
PH, URINE: 5.5 (ref 5–8)
PROTEIN UA: 100 MG/DL
RBC URINE: ABNORMAL /HPF (ref 0–6)
SPECIFIC GRAVITY UA: >=1.03 (ref 1–1.03)
SQUAMOUS EPITHELIAL: 4 /HPF
TRICHOMONAS: ABNORMAL /HPF
UROBILINOGEN, URINE: 0.2 MG/DL (ref 0.2–1)
WBC UA: 11 /HPF (ref 0–5)

## 2022-09-04 PROCEDURE — 85027 COMPLETE CBC AUTOMATED: CPT

## 2022-09-04 PROCEDURE — 80048 BASIC METABOLIC PNL TOTAL CA: CPT

## 2022-09-04 PROCEDURE — 83880 ASSAY OF NATRIURETIC PEPTIDE: CPT

## 2022-09-05 LAB
ANION GAP SERPL CALCULATED.3IONS-SCNC: 15 MMOL/L (ref 4–16)
BUN BLDV-MCNC: 38 MG/DL (ref 6–23)
CALCIUM SERPL-MCNC: 9.3 MG/DL (ref 8.3–10.6)
CHLORIDE BLD-SCNC: 105 MMOL/L (ref 99–110)
CO2: 21 MMOL/L (ref 21–32)
CREAT SERPL-MCNC: 1.2 MG/DL (ref 0.6–1.1)
CULTURE: ABNORMAL
CULTURE: ABNORMAL
GFR AFRICAN AMERICAN: 51 ML/MIN/1.73M2
GFR NON-AFRICAN AMERICAN: 42 ML/MIN/1.73M2
GLUCOSE BLD-MCNC: 99 MG/DL (ref 70–99)
HCT VFR BLD CALC: 44.6 % (ref 37–47)
HEMOGLOBIN: 13.8 GM/DL (ref 12.5–16)
Lab: ABNORMAL
MCH RBC QN AUTO: 31.1 PG (ref 27–31)
MCHC RBC AUTO-ENTMCNC: 30.9 % (ref 32–36)
MCV RBC AUTO: 100.5 FL (ref 78–100)
PDW BLD-RTO: 15 % (ref 11.7–14.9)
PLATELET # BLD: 226 K/CU MM (ref 140–440)
PMV BLD AUTO: 11.1 FL (ref 7.5–11.1)
POTASSIUM SERPL-SCNC: 4.3 MMOL/L (ref 3.5–5.1)
RBC # BLD: 4.44 M/CU MM (ref 4.2–5.4)
SODIUM BLD-SCNC: 141 MMOL/L (ref 135–145)
SPECIMEN: ABNORMAL
WBC # BLD: 9.2 K/CU MM (ref 4–10.5)

## 2022-09-06 ENCOUNTER — HOSPITAL ENCOUNTER (OUTPATIENT)
Age: 87
Setting detail: SPECIMEN
Discharge: HOME | End: 2022-09-06
Payer: MEDICARE

## 2022-09-06 LAB
ANION GAP SERPL CALCULATED.3IONS-SCNC: 14 MMOL/L (ref 4–16)
BUN BLDV-MCNC: 34 MG/DL (ref 6–23)
CALCIUM SERPL-MCNC: 9.1 MG/DL (ref 8.3–10.6)
CHLORIDE BLD-SCNC: 111 MMOL/L (ref 99–110)
CO2: 17 MMOL/L (ref 21–32)
CREAT SERPL-MCNC: 0.9 MG/DL (ref 0.6–1.1)
GFR AFRICAN AMERICAN: >60 ML/MIN/1.73M2
GFR NON-AFRICAN AMERICAN: 59 ML/MIN/1.73M2
GLUCOSE BLD-MCNC: 72 MG/DL (ref 70–99)
HCT VFR BLD CALC: 45.9 % (ref 37–47)
HEMOGLOBIN: 13.8 GM/DL (ref 12.5–16)
MCH RBC QN AUTO: 31.4 PG (ref 27–31)
MCHC RBC AUTO-ENTMCNC: 30.1 % (ref 32–36)
MCV RBC AUTO: 104.6 FL (ref 78–100)
PDW BLD-RTO: 14.8 % (ref 11.7–14.9)
PLATELET # BLD: 196 K/CU MM (ref 140–440)
PMV BLD AUTO: 11.3 FL (ref 7.5–11.1)
POTASSIUM SERPL-SCNC: 4.4 MMOL/L (ref 3.5–5.1)
PRO-BNP: ABNORMAL PG/ML
RBC # BLD: 4.39 M/CU MM (ref 4.2–5.4)
SODIUM BLD-SCNC: 142 MMOL/L (ref 135–145)
WBC # BLD: 8.8 K/CU MM (ref 4–10.5)

## 2022-09-06 PROCEDURE — 80048 BASIC METABOLIC PNL TOTAL CA: CPT

## 2022-09-06 PROCEDURE — 85027 COMPLETE CBC AUTOMATED: CPT

## 2022-09-06 PROCEDURE — 36415 COLL VENOUS BLD VENIPUNCTURE: CPT

## 2022-09-13 ENCOUNTER — HOSPITAL ENCOUNTER (OUTPATIENT)
Age: 87
Setting detail: SPECIMEN
Discharge: HOME | End: 2022-09-13
Payer: MEDICARE

## 2022-09-13 LAB
ANION GAP SERPL CALCULATED.3IONS-SCNC: 11 MMOL/L (ref 4–16)
BUN BLDV-MCNC: 20 MG/DL (ref 6–23)
CALCIUM SERPL-MCNC: 8.9 MG/DL (ref 8.3–10.6)
CHLORIDE BLD-SCNC: 107 MMOL/L (ref 99–110)
CO2: 25 MMOL/L (ref 21–32)
CREAT SERPL-MCNC: 1.2 MG/DL (ref 0.6–1.1)
GFR AFRICAN AMERICAN: 51 ML/MIN/1.73M2
GFR NON-AFRICAN AMERICAN: 42 ML/MIN/1.73M2
GLUCOSE BLD-MCNC: 81 MG/DL (ref 70–99)
HCT VFR BLD CALC: 46.7 % (ref 37–47)
HEMOGLOBIN: 14.1 GM/DL (ref 12.5–16)
MCH RBC QN AUTO: 31.1 PG (ref 27–31)
MCHC RBC AUTO-ENTMCNC: 30.2 % (ref 32–36)
MCV RBC AUTO: 102.9 FL (ref 78–100)
PDW BLD-RTO: 14.9 % (ref 11.7–14.9)
PLATELET # BLD: 176 K/CU MM (ref 140–440)
PMV BLD AUTO: 10.9 FL (ref 7.5–11.1)
POTASSIUM SERPL-SCNC: 3.7 MMOL/L (ref 3.5–5.1)
RBC # BLD: 4.54 M/CU MM (ref 4.2–5.4)
SODIUM BLD-SCNC: 143 MMOL/L (ref 135–145)
WBC # BLD: 7.3 K/CU MM (ref 4–10.5)

## 2022-09-13 PROCEDURE — 85027 COMPLETE CBC AUTOMATED: CPT

## 2022-09-13 PROCEDURE — 80048 BASIC METABOLIC PNL TOTAL CA: CPT

## 2022-09-13 PROCEDURE — 36415 COLL VENOUS BLD VENIPUNCTURE: CPT

## 2022-09-20 ENCOUNTER — HOSPITAL ENCOUNTER (OUTPATIENT)
Age: 87
Setting detail: SPECIMEN
Discharge: HOME | End: 2022-09-20
Payer: MEDICARE

## 2022-09-20 LAB
ANION GAP SERPL CALCULATED.3IONS-SCNC: 10 MMOL/L (ref 4–16)
BUN BLDV-MCNC: 19 MG/DL (ref 6–23)
CALCIUM SERPL-MCNC: 8.7 MG/DL (ref 8.3–10.6)
CHLORIDE BLD-SCNC: 101 MMOL/L (ref 99–110)
CO2: 26 MMOL/L (ref 21–32)
CREAT SERPL-MCNC: 0.9 MG/DL (ref 0.6–1.1)
GFR AFRICAN AMERICAN: >60 ML/MIN/1.73M2
GFR NON-AFRICAN AMERICAN: 59 ML/MIN/1.73M2
GLUCOSE BLD-MCNC: 71 MG/DL (ref 70–99)
HCT VFR BLD CALC: 43 % (ref 37–47)
HEMOGLOBIN: 13.3 GM/DL (ref 12.5–16)
MCH RBC QN AUTO: 31.8 PG (ref 27–31)
MCHC RBC AUTO-ENTMCNC: 30.9 % (ref 32–36)
MCV RBC AUTO: 102.9 FL (ref 78–100)
PDW BLD-RTO: 14.9 % (ref 11.7–14.9)
PLATELET # BLD: 121 K/CU MM (ref 140–440)
PMV BLD AUTO: 10.5 FL (ref 7.5–11.1)
POTASSIUM SERPL-SCNC: 4.3 MMOL/L (ref 3.5–5.1)
RBC # BLD: 4.18 M/CU MM (ref 4.2–5.4)
SODIUM BLD-SCNC: 137 MMOL/L (ref 135–145)
WBC # BLD: 6.4 K/CU MM (ref 4–10.5)

## 2022-09-20 PROCEDURE — 80048 BASIC METABOLIC PNL TOTAL CA: CPT

## 2022-09-20 PROCEDURE — 85027 COMPLETE CBC AUTOMATED: CPT

## 2022-09-20 PROCEDURE — 36415 COLL VENOUS BLD VENIPUNCTURE: CPT

## 2022-09-27 ENCOUNTER — HOSPITAL ENCOUNTER (OUTPATIENT)
Age: 87
Setting detail: SPECIMEN
Discharge: HOME | End: 2022-09-27
Payer: MEDICARE

## 2022-09-27 LAB
ANION GAP SERPL CALCULATED.3IONS-SCNC: 15 MMOL/L (ref 4–16)
BUN BLDV-MCNC: 20 MG/DL (ref 6–23)
CALCIUM SERPL-MCNC: 9.7 MG/DL (ref 8.3–10.6)
CHLORIDE BLD-SCNC: 101 MMOL/L (ref 99–110)
CO2: 27 MMOL/L (ref 21–32)
CREAT SERPL-MCNC: 1 MG/DL (ref 0.6–1.1)
GFR AFRICAN AMERICAN: >60 ML/MIN/1.73M2
GFR NON-AFRICAN AMERICAN: 52 ML/MIN/1.73M2
GLUCOSE BLD-MCNC: 94 MG/DL (ref 70–99)
HCT VFR BLD CALC: 51.3 % (ref 37–47)
HEMOGLOBIN: 15.8 GM/DL (ref 12.5–16)
MCH RBC QN AUTO: 31.3 PG (ref 27–31)
MCHC RBC AUTO-ENTMCNC: 30.8 % (ref 32–36)
MCV RBC AUTO: 101.6 FL (ref 78–100)
PDW BLD-RTO: 14.8 % (ref 11.7–14.9)
PLATELET # BLD: 219 K/CU MM (ref 140–440)
PMV BLD AUTO: 10.7 FL (ref 7.5–11.1)
POTASSIUM SERPL-SCNC: 4.3 MMOL/L (ref 3.5–5.1)
RBC # BLD: 5.05 M/CU MM (ref 4.2–5.4)
SODIUM BLD-SCNC: 143 MMOL/L (ref 135–145)
WBC # BLD: 7.2 K/CU MM (ref 4–10.5)

## 2022-09-27 PROCEDURE — 36415 COLL VENOUS BLD VENIPUNCTURE: CPT

## 2022-09-27 PROCEDURE — 85027 COMPLETE CBC AUTOMATED: CPT

## 2022-09-27 PROCEDURE — 80048 BASIC METABOLIC PNL TOTAL CA: CPT

## 2022-10-04 ENCOUNTER — HOSPITAL ENCOUNTER (OUTPATIENT)
Age: 87
Setting detail: SPECIMEN
Discharge: HOME | End: 2022-10-04
Payer: MEDICARE

## 2022-10-04 LAB
ANION GAP SERPL CALCULATED.3IONS-SCNC: 11 MMOL/L (ref 4–16)
BUN BLDV-MCNC: 20 MG/DL (ref 6–23)
CALCIUM SERPL-MCNC: 9.2 MG/DL (ref 8.3–10.6)
CHLORIDE BLD-SCNC: 104 MMOL/L (ref 99–110)
CO2: 27 MMOL/L (ref 21–32)
CREAT SERPL-MCNC: 0.9 MG/DL (ref 0.6–1.1)
GFR AFRICAN AMERICAN: >60 ML/MIN/1.73M2
GFR NON-AFRICAN AMERICAN: 59 ML/MIN/1.73M2
GLUCOSE BLD-MCNC: 76 MG/DL (ref 70–99)
HCT VFR BLD CALC: 44.5 % (ref 37–47)
HEMOGLOBIN: 13.5 GM/DL (ref 12.5–16)
MCH RBC QN AUTO: 32 PG (ref 27–31)
MCHC RBC AUTO-ENTMCNC: 30.3 % (ref 32–36)
MCV RBC AUTO: 105.5 FL (ref 78–100)
PDW BLD-RTO: 14.7 % (ref 11.7–14.9)
PLATELET # BLD: 171 K/CU MM (ref 140–440)
PMV BLD AUTO: 10.4 FL (ref 7.5–11.1)
POTASSIUM SERPL-SCNC: 4.2 MMOL/L (ref 3.5–5.1)
RBC # BLD: 4.22 M/CU MM (ref 4.2–5.4)
SODIUM BLD-SCNC: 142 MMOL/L (ref 135–145)
WBC # BLD: 6.4 K/CU MM (ref 4–10.5)

## 2022-10-04 PROCEDURE — 36415 COLL VENOUS BLD VENIPUNCTURE: CPT

## 2022-10-04 PROCEDURE — 85027 COMPLETE CBC AUTOMATED: CPT

## 2022-10-04 PROCEDURE — 80048 BASIC METABOLIC PNL TOTAL CA: CPT

## 2022-10-05 ENCOUNTER — APPOINTMENT (OUTPATIENT)
Dept: CT IMAGING | Age: 87
End: 2022-10-05
Payer: MEDICARE

## 2022-10-05 ENCOUNTER — APPOINTMENT (OUTPATIENT)
Dept: GENERAL RADIOLOGY | Age: 87
End: 2022-10-05
Payer: MEDICARE

## 2022-10-05 ENCOUNTER — HOSPITAL ENCOUNTER (EMERGENCY)
Age: 87
Discharge: HOME | End: 2022-10-05
Payer: MEDICARE

## 2022-10-05 VITALS
HEART RATE: 107 BPM | BODY MASS INDEX: 16.97 KG/M2 | RESPIRATION RATE: 20 BRPM | WEIGHT: 112 LBS | HEIGHT: 68 IN | SYSTOLIC BLOOD PRESSURE: 151 MMHG | TEMPERATURE: 98.1 F | DIASTOLIC BLOOD PRESSURE: 94 MMHG | OXYGEN SATURATION: 97 %

## 2022-10-05 DIAGNOSIS — S00.03XA HEMATOMA OF SCALP, INITIAL ENCOUNTER: ICD-10-CM

## 2022-10-05 DIAGNOSIS — S09.90XA INJURY OF HEAD, INITIAL ENCOUNTER: Primary | ICD-10-CM

## 2022-10-05 DIAGNOSIS — N39.0 URINARY TRACT INFECTION WITHOUT HEMATURIA, SITE UNSPECIFIED: ICD-10-CM

## 2022-10-05 LAB
ALBUMIN SERPL-MCNC: 4.3 GM/DL (ref 3.4–5)
ALP BLD-CCNC: 91 IU/L (ref 40–129)
ALT SERPL-CCNC: 17 U/L (ref 10–40)
ANION GAP SERPL CALCULATED.3IONS-SCNC: 11 MMOL/L (ref 4–16)
AST SERPL-CCNC: 27 IU/L (ref 15–37)
BACTERIA: ABNORMAL /HPF
BASOPHILS ABSOLUTE: 0 K/CU MM
BASOPHILS RELATIVE PERCENT: 0.6 % (ref 0–1)
BILIRUB SERPL-MCNC: 0.5 MG/DL (ref 0–1)
BILIRUBIN URINE: NEGATIVE MG/DL
BLOOD, URINE: NORMAL
BUN BLDV-MCNC: 19 MG/DL (ref 6–23)
CALCIUM SERPL-MCNC: 10 MG/DL (ref 8.3–10.6)
CHLORIDE BLD-SCNC: 96 MMOL/L (ref 99–110)
CLARITY: CLEAR
CO2: 29 MMOL/L (ref 21–32)
COLOR: YELLOW
CREAT SERPL-MCNC: 1 MG/DL (ref 0.6–1.1)
DIFFERENTIAL TYPE: ABNORMAL
EKG ATRIAL RATE: 92 BPM
EKG DIAGNOSIS: NORMAL
EKG P AXIS: 77 DEGREES
EKG P-R INTERVAL: 136 MS
EKG Q-T INTERVAL: 384 MS
EKG QRS DURATION: 78 MS
EKG QTC CALCULATION (BAZETT): 474 MS
EKG R AXIS: 56 DEGREES
EKG T AXIS: 85 DEGREES
EKG VENTRICULAR RATE: 92 BPM
EOSINOPHILS ABSOLUTE: 0 K/CU MM
EOSINOPHILS RELATIVE PERCENT: 0.2 % (ref 0–3)
GFR AFRICAN AMERICAN: >60 ML/MIN/1.73M2
GFR NON-AFRICAN AMERICAN: 52 ML/MIN/1.73M2
GLUCOSE BLD-MCNC: 104 MG/DL (ref 70–99)
GLUCOSE, URINE: NEGATIVE MG/DL
HCT VFR BLD CALC: 45.3 % (ref 37–47)
HEMOGLOBIN: 15 GM/DL (ref 12.5–16)
IMMATURE NEUTROPHIL %: 0.3 % (ref 0–0.43)
KETONES, URINE: NEGATIVE MG/DL
LEUKOCYTE ESTERASE, URINE: NORMAL
LYMPHOCYTES ABSOLUTE: 0.8 K/CU MM
LYMPHOCYTES RELATIVE PERCENT: 11.9 % (ref 24–44)
MCH RBC QN AUTO: 31.4 PG (ref 27–31)
MCHC RBC AUTO-ENTMCNC: 33.1 % (ref 32–36)
MCV RBC AUTO: 95 FL (ref 78–100)
MONOCYTES ABSOLUTE: 0.9 K/CU MM
MONOCYTES RELATIVE PERCENT: 13.6 % (ref 0–4)
MUCUS: ABNORMAL HPF
NITRITE URINE, QUANTITATIVE: NEGATIVE
NUCLEATED RBC %: 0 %
PDW BLD-RTO: 14.5 % (ref 11.7–14.9)
PH, URINE: 6.5 (ref 5–8)
PLATELET # BLD: 172 K/CU MM (ref 140–440)
PMV BLD AUTO: 10.5 FL (ref 7.5–11.1)
POTASSIUM SERPL-SCNC: 4.5 MMOL/L (ref 3.5–5.1)
PRO-BNP: 4128 PG/ML
PROTEIN UA: NEGATIVE MG/DL
RBC # BLD: 4.77 M/CU MM (ref 4.2–5.4)
RBC URINE: 9 /HPF (ref 0–6)
SEGMENTED NEUTROPHILS ABSOLUTE COUNT: 4.9 K/CU MM
SEGMENTED NEUTROPHILS RELATIVE PERCENT: 73.4 % (ref 36–66)
SODIUM BLD-SCNC: 136 MMOL/L (ref 135–145)
SPECIFIC GRAVITY UA: 1.01 (ref 1–1.03)
SQUAMOUS EPITHELIAL: 1 /HPF
TOTAL CK: 130 IU/L (ref 26–140)
TOTAL IMMATURE NEUTOROPHIL: 0.02 K/CU MM
TOTAL NUCLEATED RBC: 0 K/CU MM
TOTAL PROTEIN: 7.3 GM/DL (ref 6.4–8.2)
TRICHOMONAS: ABNORMAL /HPF
TROPONIN T: <0.01 NG/ML
UROBILINOGEN, URINE: 0.2 MG/DL (ref 0.2–1)
WBC # BLD: 6.6 K/CU MM (ref 4–10.5)
WBC CLUMP: ABNORMAL /HPF
WBC UA: 65 /HPF (ref 0–5)

## 2022-10-05 PROCEDURE — 70450 CT HEAD/BRAIN W/O DYE: CPT

## 2022-10-05 PROCEDURE — 85025 COMPLETE CBC W/AUTO DIFF WBC: CPT

## 2022-10-05 PROCEDURE — 84484 ASSAY OF TROPONIN QUANT: CPT

## 2022-10-05 PROCEDURE — 93005 ELECTROCARDIOGRAM TRACING: CPT | Performed by: PHYSICIAN ASSISTANT

## 2022-10-05 PROCEDURE — 73502 X-RAY EXAM HIP UNI 2-3 VIEWS: CPT

## 2022-10-05 PROCEDURE — 80053 COMPREHEN METABOLIC PANEL: CPT

## 2022-10-05 PROCEDURE — 87088 URINE BACTERIA CULTURE: CPT

## 2022-10-05 PROCEDURE — 87086 URINE CULTURE/COLONY COUNT: CPT

## 2022-10-05 PROCEDURE — 87186 SC STD MICRODIL/AGAR DIL: CPT

## 2022-10-05 PROCEDURE — 71045 X-RAY EXAM CHEST 1 VIEW: CPT

## 2022-10-05 PROCEDURE — 99285 EMERGENCY DEPT VISIT HI MDM: CPT

## 2022-10-05 PROCEDURE — 83880 ASSAY OF NATRIURETIC PEPTIDE: CPT

## 2022-10-05 PROCEDURE — 81001 URINALYSIS AUTO W/SCOPE: CPT

## 2022-10-05 PROCEDURE — 93010 ELECTROCARDIOGRAM REPORT: CPT | Performed by: INTERNAL MEDICINE

## 2022-10-05 PROCEDURE — 72125 CT NECK SPINE W/O DYE: CPT

## 2022-10-05 PROCEDURE — 82550 ASSAY OF CK (CPK): CPT

## 2022-10-05 RX ORDER — CEPHALEXIN 500 MG/1
500 CAPSULE ORAL 4 TIMES DAILY
Qty: 28 CAPSULE | Refills: 0 | Status: SHIPPED | OUTPATIENT
Start: 2022-10-05 | End: 2022-10-12

## 2022-10-05 ASSESSMENT — PAIN - FUNCTIONAL ASSESSMENT
PAIN_FUNCTIONAL_ASSESSMENT: NONE - DENIES PAIN
PAIN_FUNCTIONAL_ASSESSMENT: NONE - DENIES PAIN

## 2022-10-05 NOTE — ED PROVIDER NOTES
EKG shows sinus rhythm at 92. Normal axis with good R wave progression. Nonspecific ST and T wave changes without ST elevation that overall appear similar to prior tracing.      Abdelrahman Mason DO  10/05/22 [id number]

## 2022-10-05 NOTE — ED NOTES
The patient presents to the emergency department alert but confused  with a complaint of an unwitnessed fall this morning. The patient was found lying down in her room with no loss of consciousness noted by staff. The patient denies any pain. The patient placed on the monitor with vital signs taken. Assessment as follows; Skin is pink, warm and dry. Lung sounds are clear.        Ginger Kumar RN  10/05/22 1122

## 2022-10-05 NOTE — ED PROVIDER NOTES
[facility] COMPLAINT    Chief Complaint   Patient presents with    Head Injury     Unwitnessed fall       HPI    Sarah Slater is a 80 y.o. female who presents from skilled nursing facility for reported unwitnessed fall with head injury and concern for right hip injury. Patient has a history of dementia, is awake upon ED arrival but not able to provide history. Initially there was concern that fall was unwitnessed at an unknown length of time however ED nurse did speak with nursing home staff. Does state fall happened just prior to calling EMS, nurse heard the patient and when she turned, patient was falling backward, while standing utilizing her was walking to strike the back of her head on the ground. Patient was immediately assessed but no reported loss of consciousness. Patient is typically able to ambulate with use of a walker but was not able to stand which prompted him to call EMS. No reported blood or clear fluid from ears nose or mouth. At this time, patient is not endorsing any pain but is a very poor historian. She is protecting her airway. Was noted to have a large hematoma to the posterior midline scalp. Was otherwise moving all other extremities except for her right hip and that there was concern by EMS that it appeared externally rotated and shortened. No anticoagulation use. Nursing home, patient is otherwise been at normal baseline state of health over the last few days. No concern for recent illnesses, fever, cough, change in appetite or urinary symptoms. When asked if she is having any chest pain, difficulty breathing abdominal pain or headache, patient responds \"no and shakes her head. \"    REVIEW OF SYSTEMS very limited from patient given dementia  Constitutional:  Denies fever,   Neurologic:  See HPI. Eyes:   Denies discharge. Musculoskeletal:  See HPI.    Cardiac: No Chest Pain, palpitations, or Chest Injury  Respiratory:  Denies cough, shortness of breath, respiratory discomfort   GI: No abdominal pain  : See HPI  Skin: See HPI    All other review of systems are negative  See HPI and nursing notes for additional information         PAST MEDICAL & SURGICAL HISTORY    Past Medical History:   Diagnosis Date    Anxiety     Asymptomatic PVCs 2012    Noted during hospital stay for TIA - No symptoms and No treatment    Breast cancer ([facility] [place] [address]     Essential hypertension, benign     Mixed hyperlipidemia     S/P mastectomy     Senile osteoporosis     Seroma, postoperative     TIA     TIA     Past Surgical History:   Procedure Laterality Date    BREAST BIOPSY Left     COLONOSCOPY  2005    ENDOSCOPY, COLON, DIAGNOSTIC      HYSTERECTOMY (CERVIX STATUS UNKNOWN)      KARL AND BSO (CERVIX REMOVED) Left        CURRENT MEDICATIONS    Current Outpatient Rx   Medication Sig Dispense Refill    cephALEXin (KEFLEX) 500 MG capsule Take 1 capsule by mouth 4 times daily for 7 days 28 capsule 0    ferrous sulfate (FE TABS 325) 325 (65 Fe) MG EC tablet Take 325 mg by mouth in the morning and 325 mg at noon and 325 mg in the evening. Take with meals. folic acid (FOLVITE) 1 MG tablet Take 1 mg by mouth in the morning. mirtazapine (REMERON) 15 MG tablet Take 15 mg by mouth nightly      memantine (NAMENDA) 10 MG tablet TAKE 1 TABLET BY MOUTH 2 TIMES DAILY 180 tablet 1    donepezil (ARICEPT) 10 MG tablet Take 1 tablet by mouth nightly 90 tablet 1    lisinopril (PRINIVIL;ZESTRIL) 20 MG tablet Take 1 tablet by mouth daily (Patient taking differently: Take 10 mg by mouth in the morning.) 30 tablet 0    metoprolol tartrate (LOPRESSOR) 25 MG tablet Take 1 tablet by mouth 2 times daily 60 tablet 0    Mastectomy Bra MISC by Does not apply route.  2 each 0       ALLERGIES    Allergies   Allergen Reactions    Macrodantin [Nitrofurantoin Macrocrystal] Swelling     Eye Swelling       SOCIAL & FAMILY HISTORY    Social History     Socioeconomic History    Marital status:  Tobacco Use    Smoking status: Former     Types: Cigarettes     Quit date: 2/10/1975     Years since quittin.6    Smokeless tobacco: Never   Substance and Sexual Activity    Alcohol use: No    Drug use: No    Sexual activity: Never     Family History   Problem Relation Age of Onset    High Blood Pressure Mother     Heart Disease Father        PHYSICAL EXAM    VITAL SIGNS: /85   Pulse 87   Temp 99 °F (37.2 °C) (Oral)   Resp 17   SpO2 98%    Constitutional:  Well developed, very thin cachectic 77-year-old female, nontoxic, awake throughout ED encounter  Scalp: There is area of large hematoma to the midline posterior occipital scalp without overlying skin breakdown lacerations or abrasions. Area is tender to palpation without palpable crepitus or step-offs of the bony skull. Neck:   No JVD. Trachea is midline. No swelling or discoloration on inspection. No bony midline step-offs tenderness or crepitus of the bony cervical thoracic or lumbar spine. Patient is noted to be spontaneously moving neck in all directions without obvious pain or deficits. Eyes: PERRL. EOMI. No nystagmus. Anterior chamber of the eyes are clear's bilaterally, no hyphema. HENT:   No trismus, airway patent. EACs and TMs clear. Negative hemotympanum bilaterally  Nares patent bilaterally without clear fluid or blood. Oropharynx clear, dentition intact   No Alvarado sign,  no raccoon sign. Respiratory:  Lungs Clear, no retractions   Cardiovascular: Giller rate and rhythm. Normal S1/S2. Equal symmetrical chest wall rise. No chest wall deformities bruising discoloration. GI: Thin abdomen, no gross discoloration or bruising.   Soft, nontender, normal bowel sounds  Musculoskeletal:  No edema, no acute deformities  Bilateral lower legs propped up on pillow, there is some external rotation and shortening of the right lower leg compared to the left with some mild tenderness palpation along the lateral right hip without palpable bony or soft tissue defects. Pelvis is otherwise stable without laxity on pelvic rock. Patient able to lift both legs up off the bed without difficulty, no significant tenderness on passive range of motion testing of the hip including flexion and internal rotation. Compartments are soft throughout the bilateral upper and lower extremities  Integument:  Well hydrated, no petechiae   Neurologic:   Alert but not able to answer any questions regarding birthdate, time or location. She is pleasantly confused, mumbling speech but no obvious slurred speech. Cranial nerves 2-12 grossly intact. She is following all commands but does require multiple repetition. 4/5 strength throughout. Negative Pronator drift.     Psych: Pleasant affect, no hallucinations      LABS:  Results for orders placed or performed during the hospital encounter of 10/05/22   CBC with Auto Differential   Result Value Ref Range    WBC 6.6 4.0 - 10.5 K/CU MM    RBC 4.77 4.2 - 5.4 M/CU MM    Hemoglobin 15.0 12.5 - 16.0 GM/DL    Hematocrit 45.3 37 - 47 %    MCV 95.0 78 - 100 FL    MCH 31.4 (H) 27 - 31 PG    MCHC 33.1 32.0 - 36.0 %    RDW 14.5 11.7 - 14.9 %    Platelets 736 784 - 381 K/CU MM    MPV 10.5 7.5 - 11.1 FL    Differential Type AUTOMATED DIFFERENTIAL     Segs Relative 73.4 (H) 36 - 66 %    Lymphocytes % 11.9 (L) 24 - 44 %    Monocytes % 13.6 (H) 0 - 4 %    Eosinophils % 0.2 0 - 3 %    Basophils % 0.6 0 - 1 %    Segs Absolute 4.9 K/CU MM    Lymphocytes Absolute 0.8 K/CU MM    Monocytes Absolute 0.9 K/CU MM    Eosinophils Absolute 0.0 K/CU MM    Basophils Absolute 0.0 K/CU MM    Nucleated RBC % 0.0 %    Total Nucleated RBC 0.0 K/CU MM    Total Immature Neutrophil 0.02 K/CU MM    Immature Neutrophil % 0.3 0 - 0.43 %   Comprehensive Metabolic Panel   Result Value Ref Range    Sodium 136 135 - 145 MMOL/L    Potassium 4.5 3.5 - 5.1 MMOL/L    Chloride 96 (L) 99 - 110 mMol/L    CO2 29 21 - 32 MMOL/L    BUN 19 6 - 23 MG/DL    Creatinine 1.0 0.6 - 1.1 MG/DL    Glucose 104 (H) 70 - 99 MG/DL    Calcium 10.0 8.3 - 10.6 MG/DL    Albumin 4.3 3.4 - 5.0 GM/DL    Total Protein 7.3 6.4 - 8.2 GM/DL    Total Bilirubin 0.5 0.0 - 1.0 MG/DL    ALT 17 10 - 40 U/L    AST 27 15 - 37 IU/L    Alkaline Phosphatase 91 40 - 129 IU/L    GFR Non- 52 (L) >60 mL/min/1.73m2    GFR African American >60 >60 mL/min/1.73m2    Anion Gap 11 4 - 16   Troponin   Result Value Ref Range    Troponin T <0.010 <0.01 NG/ML   Brain Natriuretic Peptide   Result Value Ref Range    Pro-BNP 4,128 (H) <300 PG/ML   CK   Result Value Ref Range    Total  26 - 140 IU/L   Urinalysis   Result Value Ref Range    Color, UA YELLOW YELLOW    Clarity, UA CLEAR CLEAR    Glucose, Urine NEGATIVE NEGATIVE MG/DL    Bilirubin Urine NEGATIVE NEGATIVE MG/DL    Ketones, Urine NEGATIVE NEGATIVE MG/DL    Specific Gravity, UA 1.010 1.001 - 1.035    Blood, Urine TRACE NEGATIVE    pH, Urine 6.5 5.0 - 8.0    Protein, UA NEGATIVE NEGATIVE MG/DL    Urobilinogen, Urine 0.2 0.2 - 1.0 MG/DL    Nitrite Urine, Quantitative NEGATIVE NEGATIVE    Leukocyte Esterase, Urine MODERATE NEGATIVE   Microscopic Urinalysis   Result Value Ref Range    RBC, UA 9 (H) 0 - 6 /HPF    WBC, UA 65 (H) 0 - 5 /HPF    Bacteria, UA OCCASIONAL (A) NEGATIVE /HPF    WBC Clumps, UA RARE /HPF    Squam Epithel, UA 1 /HPF    Mucus, UA RARE (A) NEGATIVE HPF    Trichomonas, UA NONE SEEN NONE SEEN /HPF   EKG 12 Lead   Result Value Ref Range    Ventricular Rate 92 BPM    Atrial Rate 92 BPM    P-R Interval 136 ms    QRS Duration 78 ms    Q-T Interval 384 ms    QTc Calculation (Bazett) 474 ms    P Axis 77 degrees    R Axis 56 degrees    T Axis 85 degrees    Diagnosis       Sinus rhythm with occasional premature ventricular complexes  Possible Left atrial enlargement  Left ventricular hypertrophy  Nonspecific ST and T wave abnormality  Abnormal ECG  When compared with ECG of 04-NOV-2020 16:22,  premature ventricular complexes are now present  T wave amplitude has decreased in Inferior leads  Nonspecific T wave abnormality, worse in Anterolateral leads           IMAGING:      CT CERVICAL SPINE WO CONTRAST (Final result)  Result time 10/05/22 13:08:00  Final result by Mathieu Hoskins DO (10/05/22 13:08:00)                Impression:    No acute intracranial or cervical spine abnormality. Small scalp hematoma overlying the right frontal/parietal bone at the vertex. Multilevel degenerative disc disease of the cervical spine, moderate to   advanced and greatest at C5 through C7. Mild anterolisthesis at C4-C5, likely degenerative. Narrative:    EXAMINATION:   CT OF THE CERVICAL SPINE WITHOUT CONTRAST; CT OF THE HEAD WITHOUT CONTRAST   10/5/2022 12:22 pm     TECHNIQUE:   CT of the cervical spine was performed without the administration of   intravenous contrast. Multiplanar reformatted images are provided for review. Automated exposure control, iterative reconstruction, and/or weight based   adjustment of the mA/kV was utilized to reduce the radiation dose to as low   as reasonably achievable.; CT of the head was performed without the   administration of intravenous contrast. Automated exposure control, iterative   reconstruction, and/or weight based adjustment of the mA/kV was utilized to   reduce the radiation dose to as low as reasonably achievable. COMPARISON:   CT head 11/04/2020     HISTORY:   ORDERING SYSTEM PROVIDED HISTORY: fall trauma   TECHNOLOGIST PROVIDED HISTORY:   Reason for exam:->fall trauma   Decision Support Exception - unselect if not a suspected or confirmed   emergency medical condition->Emergency Medical Condition (MA)   Reason for Exam: fell and hit head   Additional signs and symptoms: none   Relevant Medical/Surgical History: none     FINDINGS:   CT HEAD:     BRAIN/VENTRICLES: There is no acute intracranial hemorrhage, mass effect, or   midline shift. No abnormal extra-axial fluid collection.  The gray-white differentiation is maintained without evidence of an acute infarct. There is   no evidence of hydrocephalus. Unchanged lacunar infarct in the left basal   ganglia. Bilateral basal ganglia calcifications. Heavy calcifications of   the intracranial internal carotid arteries. Mild prominence of the ventricles and sulci consistent with parenchymal   volume loss, unchanged. Moderate low-density white matter changes, unchanged. ORBITS: The visualized portion of the orbits demonstrate no acute   abnormality. Status post bilateral lens extraction. SINUSES:  The visualized paranasal sinuses and mastoid air cells demonstrate   no acute abnormality. SOFT TISSUES/SKULL: No acute abnormality of the visualized skull. Small   scalp hematoma overlying the right frontal/parietal bone at the vertex   measuring up to 0.9 cm. CT CERVICAL SPINE:     BONES/ALIGNMENT: There is no acute fracture or traumatic malalignment. Reversal of the normal cervical lordosis at C5-C6. Mild anterolisthesis at   C4-C5, likely degenerative. Multilevel marginal osteophytosis and   intervertebral disc space narrowing, moderate to advanced and greatest at C5   through C7. Multilevel facet and uncovertebral arthropathy. DEGENERATIVE CHANGES: No significant degenerative changes. SOFT TISSUES AND LUNG APICES: There is no prevertebral soft tissue swelling. Mild biapical pleural thickening and pleural calcifications. Moderate   vascular calcifications. Normal thyroid gland. CT HEAD WO CONTRAST (Final result)  Result time 10/05/22 13:08:00  Final result by Ruslan Lutz DO (10/05/22 13:08:00)                Impression:    No acute intracranial or cervical spine abnormality. Small scalp hematoma overlying the right frontal/parietal bone at the vertex. Multilevel degenerative disc disease of the cervical spine, moderate to   advanced and greatest at C5 through C7.      Mild anterolisthesis at C4-C5, likely degenerative. Narrative:    EXAMINATION:   CT OF THE CERVICAL SPINE WITHOUT CONTRAST; CT OF THE HEAD WITHOUT CONTRAST   10/5/2022 12:22 pm     TECHNIQUE:   CT of the cervical spine was performed without the administration of   intravenous contrast. Multiplanar reformatted images are provided for review. Automated exposure control, iterative reconstruction, and/or weight based   adjustment of the mA/kV was utilized to reduce the radiation dose to as low   as reasonably achievable.; CT of the head was performed without the   administration of intravenous contrast. Automated exposure control, iterative   reconstruction, and/or weight based adjustment of the mA/kV was utilized to   reduce the radiation dose to as low as reasonably achievable. COMPARISON:   CT head 11/04/2020     HISTORY:   ORDERING SYSTEM PROVIDED HISTORY: fall trauma   TECHNOLOGIST PROVIDED HISTORY:   Reason for exam:->fall trauma   Decision Support Exception - unselect if not a suspected or confirmed   emergency medical condition->Emergency Medical Condition (MA)   Reason for Exam: fell and hit head   Additional signs and symptoms: none   Relevant Medical/Surgical History: none     FINDINGS:   CT HEAD:     BRAIN/VENTRICLES: There is no acute intracranial hemorrhage, mass effect, or   midline shift. No abnormal extra-axial fluid collection. The gray-white   differentiation is maintained without evidence of an acute infarct. There is   no evidence of hydrocephalus. Unchanged lacunar infarct in the left basal   ganglia. Bilateral basal ganglia calcifications. Heavy calcifications of   the intracranial internal carotid arteries. Mild prominence of the ventricles and sulci consistent with parenchymal   volume loss, unchanged. Moderate low-density white matter changes, unchanged. ORBITS: The visualized portion of the orbits demonstrate no acute   abnormality. Status post bilateral lens extraction.      SINUSES: The visualized paranasal sinuses and mastoid air cells demonstrate   no acute abnormality. SOFT TISSUES/SKULL: No acute abnormality of the visualized skull. Small   scalp hematoma overlying the right frontal/parietal bone at the vertex   measuring up to 0.9 cm. CT CERVICAL SPINE:     BONES/ALIGNMENT: There is no acute fracture or traumatic malalignment. Reversal of the normal cervical lordosis at C5-C6. Mild anterolisthesis at   C4-C5, likely degenerative. Multilevel marginal osteophytosis and   intervertebral disc space narrowing, moderate to advanced and greatest at C5   through C7. Multilevel facet and uncovertebral arthropathy. DEGENERATIVE CHANGES: No significant degenerative changes. SOFT TISSUES AND LUNG APICES: There is no prevertebral soft tissue swelling. Mild biapical pleural thickening and pleural calcifications. Moderate   vascular calcifications. Normal thyroid gland. XR HIP 2-3 VW W PELVIS RIGHT (Final result)  Result time 10/05/22 12:42:44  Final result by Kaushik Huerta MD (10/05/22 12:42:44)                Impression:    No acute osseous abnormality. Narrative:    EXAMINATION:   ONE XRAY VIEW OF THE PELVIS AND TWO XRAY VIEWS RIGHT HIP     10/5/2022 11:47 am     COMPARISON:   01/03/2019     HISTORY:   ORDERING SYSTEM PROVIDED HISTORY: unwitnessed fall   TECHNOLOGIST PROVIDED HISTORY:   Reason for exam:->unwitnessed fall   Reason for Exam: unwitnessed fall     FINDINGS:   There is no evidence of acute fracture. There is normal alignment. No acute   joint abnormality. No focal osseous lesion. No focal soft tissue   abnormality. Status post ORIF of the right femur without evidence of   complication. XR CHEST PORTABLE (Final result)  Result time 10/05/22 12:37:27  Final result by Kvng Guzman MD (10/05/22 12:37:27)                Impression:    1. Cardiomegaly without overt failure.              Narrative: EXAMINATION:   ONE XRAY VIEW OF THE CHEST     10/5/2022 11:47 am     COMPARISON:   11/04/2020. HISTORY:   ORDERING SYSTEM PROVIDED HISTORY: chest pain   TECHNOLOGIST PROVIDED HISTORY:   Reason for exam:->chest pain   Reason for Exam: chest pain     FINDINGS:   The heart size is enlarged. The pulmonary vasculature is within normal   limits. No acute infiltrates are seen. No pneumothoraces are noted. EKG Interpretation  Please see ED physician's note - Dr. Price Arias - for EKG interpretation    ED COURSE & MEDICAL DECISION MAKING      Vital signs and nursing notes reviewed during ED course. I have independently evaluated this patient . Supervising MD - Dr Price Arias - present in the Emergency Department, available for consultation, throughout entirety of  patient care. All pertinent Lab data and radiographic results reviewed with patient at bedside. The patient and/or the family were informed of the results of any tests/labs/imaging, the treatment plan, and time was allotted to answer questions. Differential Diagnosis: Epidural Hematoma, Subdural Hematoma, Parenchymal Brain contusion or bleed, Subarachnoid hemorrhage, Skull Fracture, Neck Fracture or Dislocation, other. Clinical  IMPRESSION    1. Injury of head, initial encounter    2. Hematoma of scalp, initial encounter    3. Urinary tract infection without hematuria, site unspecified        Patient presents with fall with head injury and possible right hip injury. On exam, patient is awake, is following commands but otherwise unable to provide HPI. She has mumbling indecipherable speech however this is baseline per nursing home staff. She is otherwise asymptomatic for any complaints. 98% on room air. Vitals otherwise stable. Noted contusion to the posterior head without palpable crepitus or step-offs of the bony skull. No midline C-spine tenderness.   There is some abnormal rotation or limb shortening of the right lower leg compared to the left however patient has no significant tenderness with passive range of motion of the hip. Pelvis is stable. No other evidence of traumatic injury to the chest wall or abdomen. Patient is following commands with frequent repetition, no gross pronator drift. Patient is not on any anticoagulation therapy on chart review. Baseline labs, imaging and EKG ordered. Patient's daughter-in-law does come to the emergency department, she does state that patient is acting her normal baseline mental status. Labs are otherwise reassuring, CBC with normal white count hemoglobin platelets. CMP without significant derangement or electrolyte disturbance. Normal renal function. Troponin within normal range. BNP is elevated 4120 but she does not appear clinically fluid overloaded and this number has downtrending significantly compared to previous labs and September 2022. Normal CK. CT head reveals no evidence of acute intracranial process or bony skull fracture but does show small scalp hematoma overlying the right parietal and frontal bone at the vertex. There is degenerative disc disease throughout the cervical spine without evidence of acute fracture or traumatic subluxation on CT neck. Chest x-ray also reveals cardiomegaly without vascular congestion or other acute cardiopulmonary process. No acute ischemic changes. X-ray of pelvis with right hip extension shows previous ORIF without evidence of acute fracture or malalignment. UA does show moderate leukocytes, 65 white blood cells, 9 red blood cells and occasional bacteria. Did send for urine culture and per recent urine culture results, patient was started on oral Keflex. Test results as well as patient's work-up was discussed with daughter-in-law at bedside. They are comfortable with patient being discharged back to [facility] skilled nursing facility.   I did consult with case management who will discuss with family as well as facility possible need for physical therapy/rehab. At this time, low clinical suspicion for intracranial bleed, spinal cord injury, spinal fracture, visceral injury, urosepsis, or other acute surgical abdomen. Patient is discharged stable condition with oral antibiotics. Will be discharged back to nursing facility, continue ambulation with assistance as well as walker. Return warnings discussed with family at bedside. Diagnosis and plan discussed in detail with patient who understands and agrees. Return to emergency Department precautions, which included any change in nature or severity of symptoms, development of numbness/tingling, or weakness, or any new symptoms, were discussed in detail with patient who understands and agrees.       (Please note the MDM and HPI sections of this note were completed with a voice recognition program.  Efforts were made to edit the dictations but occasionally words are mis-transcribed.)                Myrna Vargas PA-C  10/05/22 1549

## 2022-10-05 NOTE — ED NOTES
The patient was transported back to the nursing home. Discharge papers and prescription sent with [facility].      Jaron Meier RN  10/05/22 [id number]

## 2022-10-05 NOTE — CARE COORDINATION
CM - Room 30 --Korinne Lusterio PA-C [place] and this CM both contributed to this case--Pt here after a mechanical fall without acute injuries. Ginny called [facility] with an initial  report to JATIN&CELSO Emanuel --another report  to be a nurse to nurse prior to discharge and transport . JATIN&R Adelfo was informed that if [facility] sees it fit to place pt in a rehabilitation effort , they can initiate this action at that time . An admission for a higher level of care is avoided as [facility] can decide upon the need , and the fact that pt has dementia. Ginny spoke with this to pt family ,the nurse, and Norberto Wesley . DANO. Pt to be discharged back to [facility] .      Luke Duran / Sigrid Councilman / Erna Manuel

## 2022-10-05 NOTE — ED NOTES
[phone] called [address] [place] [address] for BLS transportation back to [facility]. ETA 90minutes. Spoke with Gaston Murphy at dispatch.       Peña Rainey  10/05/22 1530

## 2022-10-07 LAB
CULTURE: ABNORMAL
CULTURE: ABNORMAL
Lab: ABNORMAL
SPECIMEN: ABNORMAL

## 2022-10-07 NOTE — PROGRESS NOTES
Pharmacy Note  ED Culture Follow-up    Lenore Rainey is a 80 y.o. female. Allergies: Macrodantin [nitrofurantoin macrocrystal]     Current antimicrobials:   Reviewed patient's urine culture - culture positive for E. Coli >100,000 CFU/ml. Patient was discharged on cephalexin 500 mg by mouth four times daily for 7 days , and culture is sensitive to prescribed medication. Antibiotic prescribed at discharge is appropriate - no changes made to antibiotic regimen. Please call with any questions.  Cam Almazan [facility], PharmD 1:13 PM 10/7/2022

## 2022-10-11 ENCOUNTER — HOSPITAL ENCOUNTER (OUTPATIENT)
Age: 87
Setting detail: SPECIMEN
Discharge: HOME | End: 2022-10-11
Payer: MEDICARE

## 2022-10-11 LAB
ANION GAP SERPL CALCULATED.3IONS-SCNC: 9 MMOL/L (ref 4–16)
BUN BLDV-MCNC: 31 MG/DL (ref 6–23)
CALCIUM SERPL-MCNC: 9.3 MG/DL (ref 8.3–10.6)
CHLORIDE BLD-SCNC: 107 MMOL/L (ref 99–110)
CO2: 24 MMOL/L (ref 21–32)
CREAT SERPL-MCNC: 0.9 MG/DL (ref 0.6–1.1)
GFR AFRICAN AMERICAN: >60 ML/MIN/1.73M2
GFR NON-AFRICAN AMERICAN: 59 ML/MIN/1.73M2
GLUCOSE BLD-MCNC: 76 MG/DL (ref 70–99)
HCT VFR BLD CALC: 43.8 % (ref 37–47)
HEMOGLOBIN: 13.5 GM/DL (ref 12.5–16)
MCH RBC QN AUTO: 31.3 PG (ref 27–31)
MCHC RBC AUTO-ENTMCNC: 30.8 % (ref 32–36)
MCV RBC AUTO: 101.6 FL (ref 78–100)
PDW BLD-RTO: 14.5 % (ref 11.7–14.9)
PLATELET # BLD: 126 K/CU MM (ref 140–440)
PMV BLD AUTO: 10.5 FL (ref 7.5–11.1)
POTASSIUM SERPL-SCNC: 4.5 MMOL/L (ref 3.5–5.1)
RBC # BLD: 4.31 M/CU MM (ref 4.2–5.4)
SODIUM BLD-SCNC: 140 MMOL/L (ref 135–145)
WBC # BLD: 4.8 K/CU MM (ref 4–10.5)

## 2022-10-11 PROCEDURE — 80048 BASIC METABOLIC PNL TOTAL CA: CPT

## 2022-10-11 PROCEDURE — 85027 COMPLETE CBC AUTOMATED: CPT

## 2022-10-11 PROCEDURE — 36415 COLL VENOUS BLD VENIPUNCTURE: CPT

## 2022-10-14 ENCOUNTER — HOSPITAL ENCOUNTER (OUTPATIENT)
Age: 87
Setting detail: SPECIMEN
Discharge: HOME | End: 2022-10-14

## 2022-10-17 RX ORDER — DONEPEZIL HYDROCHLORIDE 10 MG/1
10 TABLET, FILM COATED ORAL NIGHTLY
Qty: 90 TABLET | Refills: 1 | Status: SHIPPED | OUTPATIENT
Start: 2022-10-17

## 2022-10-17 NOTE — TELEPHONE ENCOUNTER
Patient's next appointment is not until 10/25 and they will need a refill to hold them over.      Requested Prescriptions     Pending Prescriptions Disp Refills    donepezil (ARICEPT) 10 MG tablet [Pharmacy Med Name: DONEPEZIL 10MG (ARICEPT) 10 Tablet] 90 tablet 1     Sig: TAKE 1 TABLET BY MOUTH NIGHTLY

## 2022-10-18 ENCOUNTER — HOSPITAL ENCOUNTER (OUTPATIENT)
Age: 87
Setting detail: SPECIMEN
Discharge: HOME | End: 2022-10-18

## 2022-10-18 LAB
ANION GAP SERPL CALCULATED.3IONS-SCNC: 12 MMOL/L (ref 4–16)
BUN BLDV-MCNC: 53 MG/DL (ref 6–23)
CALCIUM SERPL-MCNC: 9.4 MG/DL (ref 8.3–10.6)
CHLORIDE BLD-SCNC: 105 MMOL/L (ref 99–110)
CO2: 23 MMOL/L (ref 21–32)
CREAT SERPL-MCNC: 1.4 MG/DL (ref 0.6–1.1)
GFR SERPL CREATININE-BSD FRML MDRD: 36 ML/MIN/1.73M2
GLUCOSE BLD-MCNC: 80 MG/DL (ref 70–99)
HCT VFR BLD CALC: 43.1 % (ref 37–47)
HEMOGLOBIN: 13.5 GM/DL (ref 12.5–16)
MCH RBC QN AUTO: 31.5 PG (ref 27–31)
MCHC RBC AUTO-ENTMCNC: 31.3 % (ref 32–36)
MCV RBC AUTO: 100.5 FL (ref 78–100)
PDW BLD-RTO: 14.1 % (ref 11.7–14.9)
PLATELET # BLD: 136 K/CU MM (ref 140–440)
PMV BLD AUTO: 10.6 FL (ref 7.5–11.1)
POTASSIUM SERPL-SCNC: 4.7 MMOL/L (ref 3.5–5.1)
RBC # BLD: 4.29 M/CU MM (ref 4.2–5.4)
SODIUM BLD-SCNC: 140 MMOL/L (ref 135–145)
WBC # BLD: 6.7 K/CU MM (ref 4–10.5)

## 2022-10-18 PROCEDURE — 36415 COLL VENOUS BLD VENIPUNCTURE: CPT

## 2022-10-18 PROCEDURE — 80048 BASIC METABOLIC PNL TOTAL CA: CPT

## 2022-10-18 PROCEDURE — 85027 COMPLETE CBC AUTOMATED: CPT

## 2022-10-21 ENCOUNTER — HOSPITAL ENCOUNTER (OUTPATIENT)
Age: 87
Setting detail: SPECIMEN
Discharge: HOME | End: 2022-10-21
Payer: MEDICARE

## 2022-10-21 LAB
ANION GAP SERPL CALCULATED.3IONS-SCNC: 12 MMOL/L (ref 4–16)
BUN BLDV-MCNC: 58 MG/DL (ref 6–23)
CALCIUM SERPL-MCNC: 9.9 MG/DL (ref 8.3–10.6)
CHLORIDE BLD-SCNC: 108 MMOL/L (ref 99–110)
CO2: 22 MMOL/L (ref 21–32)
CREAT SERPL-MCNC: 1.2 MG/DL (ref 0.6–1.1)
GFR SERPL CREATININE-BSD FRML MDRD: 44 ML/MIN/1.73M2
GLUCOSE BLD-MCNC: 99 MG/DL (ref 70–99)
POTASSIUM SERPL-SCNC: 5.2 MMOL/L (ref 3.5–5.1)
SODIUM BLD-SCNC: 142 MMOL/L (ref 135–145)

## 2022-10-21 PROCEDURE — 36415 COLL VENOUS BLD VENIPUNCTURE: CPT

## 2022-10-21 PROCEDURE — 80048 BASIC METABOLIC PNL TOTAL CA: CPT

## 2022-10-25 ENCOUNTER — HOSPITAL ENCOUNTER (OUTPATIENT)
Age: 87
Setting detail: SPECIMEN
Discharge: HOME | End: 2022-10-25
Payer: MEDICARE

## 2022-10-25 LAB
ANION GAP SERPL CALCULATED.3IONS-SCNC: 11 MMOL/L (ref 4–16)
BUN BLDV-MCNC: 27 MG/DL (ref 6–23)
CALCIUM SERPL-MCNC: 9.3 MG/DL (ref 8.3–10.6)
CHLORIDE BLD-SCNC: 104 MMOL/L (ref 99–110)
CO2: 24 MMOL/L (ref 21–32)
CREAT SERPL-MCNC: 1 MG/DL (ref 0.6–1.1)
GFR SERPL CREATININE-BSD FRML MDRD: 55 ML/MIN/1.73M2
GLUCOSE BLD-MCNC: 76 MG/DL (ref 70–99)
HCT VFR BLD CALC: 35 % (ref 37–47)
HEMOGLOBIN: 11.5 GM/DL (ref 12.5–16)
MCH RBC QN AUTO: 31.8 PG (ref 27–31)
MCHC RBC AUTO-ENTMCNC: 32.9 % (ref 32–36)
MCV RBC AUTO: 96.7 FL (ref 78–100)
PDW BLD-RTO: 13.2 % (ref 11.7–14.9)
PLATELET # BLD: 133 K/CU MM (ref 140–440)
PMV BLD AUTO: 10.9 FL (ref 7.5–11.1)
POTASSIUM SERPL-SCNC: 3.7 MMOL/L (ref 3.5–5.1)
RBC # BLD: 3.62 M/CU MM (ref 4.2–5.4)
SODIUM BLD-SCNC: 139 MMOL/L (ref 135–145)
WBC # BLD: 5.8 K/CU MM (ref 4–10.5)

## 2022-10-25 PROCEDURE — 80048 BASIC METABOLIC PNL TOTAL CA: CPT

## 2022-10-25 PROCEDURE — 85027 COMPLETE CBC AUTOMATED: CPT

## 2022-10-25 PROCEDURE — 36415 COLL VENOUS BLD VENIPUNCTURE: CPT

## 2022-11-01 ENCOUNTER — HOSPITAL ENCOUNTER (OUTPATIENT)
Age: 87
Setting detail: SPECIMEN
Discharge: HOME | End: 2022-11-01
Payer: MEDICARE

## 2022-11-01 LAB
ANION GAP SERPL CALCULATED.3IONS-SCNC: 10 MMOL/L (ref 4–16)
BUN BLDV-MCNC: 20 MG/DL (ref 6–23)
CALCIUM SERPL-MCNC: 8.9 MG/DL (ref 8.3–10.6)
CHLORIDE BLD-SCNC: 105 MMOL/L (ref 99–110)
CO2: 26 MMOL/L (ref 21–32)
CREAT SERPL-MCNC: 0.8 MG/DL (ref 0.6–1.1)
GFR SERPL CREATININE-BSD FRML MDRD: >60 ML/MIN/1.73M2
GLUCOSE BLD-MCNC: 81 MG/DL (ref 70–99)
HCT VFR BLD CALC: 31.6 % (ref 37–47)
HEMOGLOBIN: 10.2 GM/DL (ref 12.5–16)
MCH RBC QN AUTO: 31.6 PG (ref 27–31)
MCHC RBC AUTO-ENTMCNC: 32.3 % (ref 32–36)
MCV RBC AUTO: 97.8 FL (ref 78–100)
PDW BLD-RTO: 13.2 % (ref 11.7–14.9)
PLATELET # BLD: 123 K/CU MM (ref 140–440)
PMV BLD AUTO: 10.7 FL (ref 7.5–11.1)
POTASSIUM SERPL-SCNC: 3.8 MMOL/L (ref 3.5–5.1)
RBC # BLD: 3.23 M/CU MM (ref 4.2–5.4)
SODIUM BLD-SCNC: 141 MMOL/L (ref 135–145)
WBC # BLD: 5.3 K/CU MM (ref 4–10.5)

## 2022-11-01 PROCEDURE — 85027 COMPLETE CBC AUTOMATED: CPT

## 2022-11-01 PROCEDURE — 80048 BASIC METABOLIC PNL TOTAL CA: CPT

## 2022-11-01 PROCEDURE — 36415 COLL VENOUS BLD VENIPUNCTURE: CPT

## 2022-11-08 ENCOUNTER — HOSPITAL ENCOUNTER (OUTPATIENT)
Age: 87
Setting detail: SPECIMEN
Discharge: HOME | End: 2022-11-08
Payer: MEDICARE

## 2022-11-08 LAB
ANION GAP SERPL CALCULATED.3IONS-SCNC: 9 MMOL/L (ref 4–16)
BUN BLDV-MCNC: 12 MG/DL (ref 6–23)
CALCIUM SERPL-MCNC: 9.3 MG/DL (ref 8.3–10.6)
CHLORIDE BLD-SCNC: 103 MMOL/L (ref 99–110)
CO2: 30 MMOL/L (ref 21–32)
CREAT SERPL-MCNC: 0.8 MG/DL (ref 0.6–1.1)
GFR SERPL CREATININE-BSD FRML MDRD: >60 ML/MIN/1.73M2
GLUCOSE BLD-MCNC: 82 MG/DL (ref 70–99)
HCT VFR BLD CALC: 34.2 % (ref 37–47)
HEMOGLOBIN: 11.2 GM/DL (ref 12.5–16)
MCH RBC QN AUTO: 31.9 PG (ref 27–31)
MCHC RBC AUTO-ENTMCNC: 32.7 % (ref 32–36)
MCV RBC AUTO: 97.4 FL (ref 78–100)
PDW BLD-RTO: 14.2 % (ref 11.7–14.9)
PLATELET # BLD: 137 K/CU MM (ref 140–440)
PMV BLD AUTO: 10.7 FL (ref 7.5–11.1)
POTASSIUM SERPL-SCNC: 4.2 MMOL/L (ref 3.5–5.1)
RBC # BLD: 3.51 M/CU MM (ref 4.2–5.4)
SODIUM BLD-SCNC: 142 MMOL/L (ref 135–145)
WBC # BLD: 5.3 K/CU MM (ref 4–10.5)

## 2022-11-08 PROCEDURE — 36415 COLL VENOUS BLD VENIPUNCTURE: CPT

## 2022-11-08 PROCEDURE — 85027 COMPLETE CBC AUTOMATED: CPT

## 2022-11-08 PROCEDURE — 80048 BASIC METABOLIC PNL TOTAL CA: CPT

## 2022-11-15 ENCOUNTER — HOSPITAL ENCOUNTER (OUTPATIENT)
Age: 87
Setting detail: SPECIMEN
Discharge: HOME | End: 2022-11-15
Payer: MEDICARE

## 2022-11-15 LAB
ANION GAP SERPL CALCULATED.3IONS-SCNC: 12 MMOL/L (ref 4–16)
BUN BLDV-MCNC: 16 MG/DL (ref 6–23)
CALCIUM SERPL-MCNC: 9.1 MG/DL (ref 8.3–10.6)
CHLORIDE BLD-SCNC: 100 MMOL/L (ref 99–110)
CO2: 29 MMOL/L (ref 21–32)
CREAT SERPL-MCNC: 0.9 MG/DL (ref 0.6–1.1)
GFR SERPL CREATININE-BSD FRML MDRD: >60 ML/MIN/1.73M2
GLUCOSE BLD-MCNC: 81 MG/DL (ref 70–99)
HCT VFR BLD CALC: 36.6 % (ref 37–47)
HEMOGLOBIN: 11.8 GM/DL (ref 12.5–16)
MCH RBC QN AUTO: 31.6 PG (ref 27–31)
MCHC RBC AUTO-ENTMCNC: 32.2 % (ref 32–36)
MCV RBC AUTO: 98.1 FL (ref 78–100)
PDW BLD-RTO: 14.9 % (ref 11.7–14.9)
PLATELET # BLD: 187 K/CU MM (ref 140–440)
PMV BLD AUTO: 10.2 FL (ref 7.5–11.1)
POTASSIUM SERPL-SCNC: 4.1 MMOL/L (ref 3.5–5.1)
RBC # BLD: 3.73 M/CU MM (ref 4.2–5.4)
SODIUM BLD-SCNC: 141 MMOL/L (ref 135–145)
WBC # BLD: 5.2 K/CU MM (ref 4–10.5)

## 2022-11-15 PROCEDURE — 80048 BASIC METABOLIC PNL TOTAL CA: CPT

## 2022-11-15 PROCEDURE — 36415 COLL VENOUS BLD VENIPUNCTURE: CPT

## 2022-11-15 PROCEDURE — 85027 COMPLETE CBC AUTOMATED: CPT

## 2022-11-22 ENCOUNTER — HOSPITAL ENCOUNTER (OUTPATIENT)
Age: 87
Setting detail: SPECIMEN
Discharge: HOME | End: 2022-11-22
Payer: MEDICARE

## 2022-11-22 LAB
ANION GAP SERPL CALCULATED.3IONS-SCNC: 10 MMOL/L (ref 4–16)
BUN BLDV-MCNC: 25 MG/DL (ref 6–23)
CALCIUM SERPL-MCNC: 9.4 MG/DL (ref 8.3–10.6)
CHLORIDE BLD-SCNC: 101 MMOL/L (ref 99–110)
CO2: 29 MMOL/L (ref 21–32)
CREAT SERPL-MCNC: 1.1 MG/DL (ref 0.6–1.1)
GFR SERPL CREATININE-BSD FRML MDRD: 49 ML/MIN/1.73M2
GLUCOSE BLD-MCNC: 83 MG/DL (ref 70–99)
HCT VFR BLD CALC: 35.8 % (ref 37–47)
HEMOGLOBIN: 11.6 GM/DL (ref 12.5–16)
MCH RBC QN AUTO: 32.4 PG (ref 27–31)
MCHC RBC AUTO-ENTMCNC: 32.4 % (ref 32–36)
MCV RBC AUTO: 100 FL (ref 78–100)
PDW BLD-RTO: 15.1 % (ref 11.7–14.9)
PLATELET # BLD: 179 K/CU MM (ref 140–440)
PMV BLD AUTO: 10.3 FL (ref 7.5–11.1)
POTASSIUM SERPL-SCNC: 4 MMOL/L (ref 3.5–5.1)
RBC # BLD: 3.58 M/CU MM (ref 4.2–5.4)
SODIUM BLD-SCNC: 140 MMOL/L (ref 135–145)
WBC # BLD: 5.5 K/CU MM (ref 4–10.5)

## 2022-11-22 PROCEDURE — 80048 BASIC METABOLIC PNL TOTAL CA: CPT

## 2022-11-22 PROCEDURE — 85027 COMPLETE CBC AUTOMATED: CPT

## 2022-11-22 PROCEDURE — 36415 COLL VENOUS BLD VENIPUNCTURE: CPT

## 2022-11-29 ENCOUNTER — HOSPITAL ENCOUNTER (OUTPATIENT)
Age: 87
Setting detail: SPECIMEN
Discharge: HOME | End: 2022-11-29
Payer: MEDICARE

## 2022-11-29 LAB
ANION GAP SERPL CALCULATED.3IONS-SCNC: 11 MMOL/L (ref 4–16)
BUN BLDV-MCNC: 15 MG/DL (ref 6–23)
CALCIUM SERPL-MCNC: 9.3 MG/DL (ref 8.3–10.6)
CHLORIDE BLD-SCNC: 100 MMOL/L (ref 99–110)
CO2: 28 MMOL/L (ref 21–32)
CREAT SERPL-MCNC: 0.9 MG/DL (ref 0.6–1.1)
GFR SERPL CREATININE-BSD FRML MDRD: >60 ML/MIN/1.73M2
GLUCOSE BLD-MCNC: 80 MG/DL (ref 70–99)
HCT VFR BLD CALC: 35.6 % (ref 37–47)
HEMOGLOBIN: 11.5 GM/DL (ref 12.5–16)
MCH RBC QN AUTO: 31.8 PG (ref 27–31)
MCHC RBC AUTO-ENTMCNC: 32.3 % (ref 32–36)
MCV RBC AUTO: 98.3 FL (ref 78–100)
PDW BLD-RTO: 15.3 % (ref 11.7–14.9)
PLATELET # BLD: 177 K/CU MM (ref 140–440)
PMV BLD AUTO: 9.9 FL (ref 7.5–11.1)
POTASSIUM SERPL-SCNC: 4 MMOL/L (ref 3.5–5.1)
RBC # BLD: 3.62 M/CU MM (ref 4.2–5.4)
SODIUM BLD-SCNC: 139 MMOL/L (ref 135–145)
WBC # BLD: 5.4 K/CU MM (ref 4–10.5)

## 2022-11-29 PROCEDURE — 85027 COMPLETE CBC AUTOMATED: CPT

## 2022-11-29 PROCEDURE — 80048 BASIC METABOLIC PNL TOTAL CA: CPT

## 2022-12-06 ENCOUNTER — HOSPITAL ENCOUNTER (OUTPATIENT)
Age: 87
Setting detail: SPECIMEN
Discharge: HOME | End: 2022-12-06
Payer: MEDICARE

## 2022-12-06 LAB
ANION GAP SERPL CALCULATED.3IONS-SCNC: 12 MMOL/L (ref 4–16)
BUN BLDV-MCNC: 22 MG/DL (ref 6–23)
CALCIUM SERPL-MCNC: 9.3 MG/DL (ref 8.3–10.6)
CHLORIDE BLD-SCNC: 102 MMOL/L (ref 99–110)
CO2: 27 MMOL/L (ref 21–32)
CREAT SERPL-MCNC: 1 MG/DL (ref 0.6–1.1)
GFR SERPL CREATININE-BSD FRML MDRD: 55 ML/MIN/1.73M2
GLUCOSE BLD-MCNC: 82 MG/DL (ref 70–99)
HCT VFR BLD CALC: 37.2 % (ref 37–47)
HEMOGLOBIN: 12.2 GM/DL (ref 12.5–16)
MCH RBC QN AUTO: 32.6 PG (ref 27–31)
MCHC RBC AUTO-ENTMCNC: 32.8 % (ref 32–36)
MCV RBC AUTO: 99.5 FL (ref 78–100)
PDW BLD-RTO: 15.1 % (ref 11.7–14.9)
PLATELET # BLD: 172 K/CU MM (ref 140–440)
PMV BLD AUTO: 10.6 FL (ref 7.5–11.1)
POTASSIUM SERPL-SCNC: 4.2 MMOL/L (ref 3.5–5.1)
RBC # BLD: 3.74 M/CU MM (ref 4.2–5.4)
SODIUM BLD-SCNC: 141 MMOL/L (ref 135–145)
WBC # BLD: 5.8 K/CU MM (ref 4–10.5)

## 2022-12-06 PROCEDURE — 85027 COMPLETE CBC AUTOMATED: CPT

## 2022-12-06 PROCEDURE — 80048 BASIC METABOLIC PNL TOTAL CA: CPT

## 2022-12-06 PROCEDURE — 36415 COLL VENOUS BLD VENIPUNCTURE: CPT

## 2022-12-13 ENCOUNTER — HOSPITAL ENCOUNTER (OUTPATIENT)
Age: 87
Setting detail: SPECIMEN
Discharge: HOME | End: 2022-12-13
Payer: MEDICARE

## 2022-12-13 LAB
ANION GAP SERPL CALCULATED.3IONS-SCNC: 14 MMOL/L (ref 4–16)
BUN BLDV-MCNC: 24 MG/DL (ref 6–23)
CALCIUM SERPL-MCNC: 9.4 MG/DL (ref 8.3–10.6)
CHLORIDE BLD-SCNC: 104 MMOL/L (ref 99–110)
CO2: 25 MMOL/L (ref 21–32)
CREAT SERPL-MCNC: 0.8 MG/DL (ref 0.6–1.1)
GFR SERPL CREATININE-BSD FRML MDRD: >60 ML/MIN/1.73M2
GLUCOSE BLD-MCNC: 75 MG/DL (ref 70–99)
HCT VFR BLD CALC: 38.3 % (ref 37–47)
HEMOGLOBIN: 11.8 GM/DL (ref 12.5–16)
MCH RBC QN AUTO: 32.3 PG (ref 27–31)
MCHC RBC AUTO-ENTMCNC: 30.8 % (ref 32–36)
MCV RBC AUTO: 104.9 FL (ref 78–100)
PDW BLD-RTO: 14.8 % (ref 11.7–14.9)
PLATELET # BLD: 154 K/CU MM (ref 140–440)
PMV BLD AUTO: 10 FL (ref 7.5–11.1)
POTASSIUM SERPL-SCNC: 3.8 MMOL/L (ref 3.5–5.1)
RBC # BLD: 3.65 M/CU MM (ref 4.2–5.4)
SODIUM BLD-SCNC: 143 MMOL/L (ref 135–145)
WBC # BLD: 6.4 K/CU MM (ref 4–10.5)

## 2022-12-13 PROCEDURE — 36415 COLL VENOUS BLD VENIPUNCTURE: CPT

## 2022-12-13 PROCEDURE — 85027 COMPLETE CBC AUTOMATED: CPT

## 2022-12-13 PROCEDURE — 80048 BASIC METABOLIC PNL TOTAL CA: CPT

## 2022-12-20 ENCOUNTER — HOSPITAL ENCOUNTER (OUTPATIENT)
Age: 87
Setting detail: SPECIMEN
Discharge: HOME | End: 2022-12-20
Payer: MEDICARE

## 2022-12-20 PROCEDURE — 80048 BASIC METABOLIC PNL TOTAL CA: CPT

## 2022-12-20 PROCEDURE — 85027 COMPLETE CBC AUTOMATED: CPT

## 2023-01-17 ENCOUNTER — HOSPITAL ENCOUNTER (OUTPATIENT)
Age: 88
Setting detail: SPECIMEN
Discharge: HOME | End: 2023-01-17
Payer: MEDICARE

## 2023-01-17 LAB
ANION GAP SERPL CALCULATED.3IONS-SCNC: 13 MMOL/L (ref 4–16)
BUN BLDV-MCNC: 29 MG/DL (ref 6–23)
CALCIUM SERPL-MCNC: 9.7 MG/DL (ref 8.3–10.6)
CHLORIDE BLD-SCNC: 102 MMOL/L (ref 99–110)
CO2: 26 MMOL/L (ref 21–32)
CREAT SERPL-MCNC: 1 MG/DL (ref 0.6–1.1)
GFR SERPL CREATININE-BSD FRML MDRD: 55 ML/MIN/1.73M2
GLUCOSE BLD-MCNC: 85 MG/DL (ref 70–99)
HCT VFR BLD CALC: 41.3 % (ref 37–47)
HEMOGLOBIN: 12.6 GM/DL (ref 12.5–16)
MCH RBC QN AUTO: 33.9 PG (ref 27–31)
MCHC RBC AUTO-ENTMCNC: 30.5 % (ref 32–36)
MCV RBC AUTO: 111 FL (ref 78–100)
PDW BLD-RTO: 13.1 % (ref 11.7–14.9)
PLATELET # BLD: 164 K/CU MM (ref 140–440)
PMV BLD AUTO: 10.2 FL (ref 7.5–11.1)
POTASSIUM SERPL-SCNC: 4.1 MMOL/L (ref 3.5–5.1)
RBC # BLD: 3.72 M/CU MM (ref 4.2–5.4)
SODIUM BLD-SCNC: 141 MMOL/L (ref 135–145)
WBC # BLD: 5.7 K/CU MM (ref 4–10.5)

## 2023-01-17 PROCEDURE — 85027 COMPLETE CBC AUTOMATED: CPT

## 2023-01-17 PROCEDURE — 36415 COLL VENOUS BLD VENIPUNCTURE: CPT

## 2023-01-17 PROCEDURE — 80048 BASIC METABOLIC PNL TOTAL CA: CPT

## 2023-02-21 ENCOUNTER — HOSPITAL ENCOUNTER (OUTPATIENT)
Age: 88
Setting detail: SPECIMEN
Discharge: HOME | End: 2023-02-21
Payer: MEDICARE

## 2023-02-21 LAB
ANION GAP SERPL CALCULATED.3IONS-SCNC: 8 MMOL/L (ref 4–16)
BUN SERPL-MCNC: 19 MG/DL (ref 6–23)
CALCIUM SERPL-MCNC: 9.4 MG/DL (ref 8.3–10.6)
CHLORIDE BLD-SCNC: 103 MMOL/L (ref 99–110)
CO2: 29 MMOL/L (ref 21–32)
CREAT SERPL-MCNC: 0.9 MG/DL (ref 0.6–1.1)
GFR SERPL CREATININE-BSD FRML MDRD: >60 ML/MIN/1.73M2
GLUCOSE SERPL-MCNC: 86 MG/DL (ref 70–99)
HCT VFR BLD CALC: 37.5 % (ref 37–47)
HEMOGLOBIN: 12.3 GM/DL (ref 12.5–16)
MCH RBC QN AUTO: 33 PG (ref 27–31)
MCHC RBC AUTO-ENTMCNC: 32.8 % (ref 32–36)
MCV RBC AUTO: 100.5 FL (ref 78–100)
PDW BLD-RTO: 12.5 % (ref 11.7–14.9)
PLATELET # BLD: 159 K/CU MM (ref 140–440)
PMV BLD AUTO: 10 FL (ref 7.5–11.1)
POTASSIUM SERPL-SCNC: 4.2 MMOL/L (ref 3.5–5.1)
RBC # BLD: 3.73 M/CU MM (ref 4.2–5.4)
SODIUM BLD-SCNC: 140 MMOL/L (ref 135–145)
WBC # BLD: 5.9 K/CU MM (ref 4–10.5)

## 2023-02-21 PROCEDURE — 80048 BASIC METABOLIC PNL TOTAL CA: CPT

## 2023-02-21 PROCEDURE — 36415 COLL VENOUS BLD VENIPUNCTURE: CPT

## 2023-02-21 PROCEDURE — 85027 COMPLETE CBC AUTOMATED: CPT

## 2023-03-21 ENCOUNTER — HOSPITAL ENCOUNTER (OUTPATIENT)
Age: 88
Setting detail: SPECIMEN
Discharge: HOME | End: 2023-03-21
Payer: MEDICARE

## 2023-03-21 LAB
ANION GAP SERPL CALCULATED.3IONS-SCNC: 8 MMOL/L (ref 4–16)
BUN SERPL-MCNC: 15 MG/DL (ref 6–23)
CALCIUM SERPL-MCNC: 9.4 MG/DL (ref 8.3–10.6)
CHLORIDE BLD-SCNC: 105 MMOL/L (ref 99–110)
CO2: 27 MMOL/L (ref 21–32)
CREAT SERPL-MCNC: 0.9 MG/DL (ref 0.6–1.1)
GFR SERPL CREATININE-BSD FRML MDRD: >60 ML/MIN/1.73M2
GLUCOSE SERPL-MCNC: 90 MG/DL (ref 70–99)
HCT VFR BLD CALC: 33.6 % (ref 37–47)
HEMOGLOBIN: 11.2 GM/DL (ref 12.5–16)
MCH RBC QN AUTO: 33.2 PG (ref 27–31)
MCHC RBC AUTO-ENTMCNC: 33.3 % (ref 32–36)
MCV RBC AUTO: 99.7 FL (ref 78–100)
PDW BLD-RTO: 12.9 % (ref 11.7–14.9)
PLATELET # BLD: 156 K/CU MM (ref 140–440)
PMV BLD AUTO: 10.1 FL (ref 7.5–11.1)
POTASSIUM SERPL-SCNC: 3.6 MMOL/L (ref 3.5–5.1)
RBC # BLD: 3.37 M/CU MM (ref 4.2–5.4)
SODIUM BLD-SCNC: 140 MMOL/L (ref 135–145)
WBC # BLD: 5.2 K/CU MM (ref 4–10.5)

## 2023-03-21 PROCEDURE — 80048 BASIC METABOLIC PNL TOTAL CA: CPT

## 2023-03-21 PROCEDURE — 36415 COLL VENOUS BLD VENIPUNCTURE: CPT

## 2023-03-21 PROCEDURE — 85027 COMPLETE CBC AUTOMATED: CPT

## 2023-04-18 ENCOUNTER — HOSPITAL ENCOUNTER (OUTPATIENT)
Age: 88
Setting detail: SPECIMEN
Discharge: HOME | End: 2023-04-18
Payer: MEDICARE

## 2023-04-18 LAB
ANION GAP SERPL CALCULATED.3IONS-SCNC: 7 MMOL/L (ref 4–16)
BUN SERPL-MCNC: 18 MG/DL (ref 6–23)
CALCIUM SERPL-MCNC: 9.3 MG/DL (ref 8.3–10.6)
CHLORIDE BLD-SCNC: 105 MMOL/L (ref 99–110)
CO2: 29 MMOL/L (ref 21–32)
CREAT SERPL-MCNC: 0.9 MG/DL (ref 0.6–1.1)
GFR SERPL CREATININE-BSD FRML MDRD: >60 ML/MIN/1.73M2
GLUCOSE SERPL-MCNC: 88 MG/DL (ref 70–99)
HCT VFR BLD CALC: 35.5 % (ref 37–47)
HEMOGLOBIN: 11.4 GM/DL (ref 12.5–16)
MCH RBC QN AUTO: 32.9 PG (ref 27–31)
MCHC RBC AUTO-ENTMCNC: 32.1 % (ref 32–36)
MCV RBC AUTO: 102.3 FL (ref 78–100)
PDW BLD-RTO: 13.2 % (ref 11.7–14.9)
PLATELET # BLD: 156 K/CU MM (ref 140–440)
PMV BLD AUTO: 10 FL (ref 7.5–11.1)
POTASSIUM SERPL-SCNC: 4.2 MMOL/L (ref 3.5–5.1)
RBC # BLD: 3.47 M/CU MM (ref 4.2–5.4)
SODIUM BLD-SCNC: 141 MMOL/L (ref 135–145)
WBC # BLD: 5.2 K/CU MM (ref 4–10.5)

## 2023-04-18 PROCEDURE — 85027 COMPLETE CBC AUTOMATED: CPT

## 2023-04-18 PROCEDURE — 36415 COLL VENOUS BLD VENIPUNCTURE: CPT

## 2023-04-18 PROCEDURE — 80048 BASIC METABOLIC PNL TOTAL CA: CPT

## 2023-05-16 ENCOUNTER — HOSPITAL ENCOUNTER (OUTPATIENT)
Age: 88
Setting detail: SPECIMEN
Discharge: HOME | End: 2023-05-16
Payer: MEDICARE

## 2023-05-16 LAB
ANION GAP SERPL CALCULATED.3IONS-SCNC: 8 MMOL/L (ref 4–16)
BUN SERPL-MCNC: 33 MG/DL (ref 6–23)
CALCIUM SERPL-MCNC: 9.5 MG/DL (ref 8.3–10.6)
CHLORIDE BLD-SCNC: 103 MMOL/L (ref 99–110)
CO2: 29 MMOL/L (ref 21–32)
CREAT SERPL-MCNC: 1.3 MG/DL (ref 0.6–1.1)
GFR SERPL CREATININE-BSD FRML MDRD: 40 ML/MIN/1.73M2
GLUCOSE SERPL-MCNC: 85 MG/DL (ref 70–99)
HCT VFR BLD CALC: 37.3 % (ref 37–47)
HEMOGLOBIN: 11.9 GM/DL (ref 12.5–16)
MCH RBC QN AUTO: 32.8 PG (ref 27–31)
MCHC RBC AUTO-ENTMCNC: 31.9 % (ref 32–36)
MCV RBC AUTO: 102.8 FL (ref 78–100)
PDW BLD-RTO: 12.7 % (ref 11.7–14.9)
PLATELET # BLD: 165 K/CU MM (ref 140–440)
PMV BLD AUTO: 10.1 FL (ref 7.5–11.1)
POTASSIUM SERPL-SCNC: 4.5 MMOL/L (ref 3.5–5.1)
RBC # BLD: 3.63 M/CU MM (ref 4.2–5.4)
SODIUM BLD-SCNC: 140 MMOL/L (ref 135–145)
WBC # BLD: 6.6 K/CU MM (ref 4–10.5)

## 2023-05-16 PROCEDURE — 85027 COMPLETE CBC AUTOMATED: CPT

## 2023-05-16 PROCEDURE — 36415 COLL VENOUS BLD VENIPUNCTURE: CPT

## 2023-05-16 PROCEDURE — 80048 BASIC METABOLIC PNL TOTAL CA: CPT

## 2023-06-20 ENCOUNTER — HOSPITAL ENCOUNTER (OUTPATIENT)
Age: 88
Setting detail: SPECIMEN
Discharge: HOME | End: 2023-06-20
Payer: MEDICARE

## 2023-06-20 LAB
ANION GAP SERPL CALCULATED.3IONS-SCNC: 11 MMOL/L (ref 4–16)
BUN SERPL-MCNC: 19 MG/DL (ref 6–23)
CALCIUM SERPL-MCNC: 9.2 MG/DL (ref 8.3–10.6)
CHLORIDE BLD-SCNC: 103 MMOL/L (ref 99–110)
CO2: 26 MMOL/L (ref 21–32)
CREAT SERPL-MCNC: 1 MG/DL (ref 0.6–1.1)
GFR SERPL CREATININE-BSD FRML MDRD: 55 ML/MIN/1.73M2
GLUCOSE SERPL-MCNC: 82 MG/DL (ref 70–99)
HCT VFR BLD CALC: 39.4 % (ref 37–47)
HEMOGLOBIN: 12.5 GM/DL (ref 12.5–16)
MCH RBC QN AUTO: 32.6 PG (ref 27–31)
MCHC RBC AUTO-ENTMCNC: 31.7 % (ref 32–36)
MCV RBC AUTO: 102.6 FL (ref 78–100)
PDW BLD-RTO: 12.6 % (ref 11.7–14.9)
PLATELET # BLD: 157 K/CU MM (ref 140–440)
PMV BLD AUTO: 10.1 FL (ref 7.5–11.1)
POTASSIUM SERPL-SCNC: 3.9 MMOL/L (ref 3.5–5.1)
RBC # BLD: 3.84 M/CU MM (ref 4.2–5.4)
SODIUM BLD-SCNC: 140 MMOL/L (ref 135–145)
WBC # BLD: 5.6 K/CU MM (ref 4–10.5)

## 2023-06-20 PROCEDURE — 36415 COLL VENOUS BLD VENIPUNCTURE: CPT

## 2023-06-20 PROCEDURE — 85027 COMPLETE CBC AUTOMATED: CPT

## 2023-06-20 PROCEDURE — 80048 BASIC METABOLIC PNL TOTAL CA: CPT
